# Patient Record
Sex: FEMALE | Race: WHITE | Employment: OTHER | URBAN - METROPOLITAN AREA
[De-identification: names, ages, dates, MRNs, and addresses within clinical notes are randomized per-mention and may not be internally consistent; named-entity substitution may affect disease eponyms.]

---

## 2017-11-14 ENCOUNTER — HOSPITAL ENCOUNTER (OUTPATIENT)
Dept: LAB | Age: 72
Discharge: HOME | End: 2017-11-14
Payer: MEDICARE

## 2017-11-14 ENCOUNTER — OFFICE VISIT (OUTPATIENT)
Dept: FAMILY MEDICINE CLINIC | Age: 72
End: 2017-11-14

## 2017-11-14 VITALS
SYSTOLIC BLOOD PRESSURE: 142 MMHG | HEART RATE: 67 BPM | RESPIRATION RATE: 18 BRPM | BODY MASS INDEX: 25.44 KG/M2 | DIASTOLIC BLOOD PRESSURE: 86 MMHG | OXYGEN SATURATION: 96 % | TEMPERATURE: 98.1 F | WEIGHT: 149 LBS | HEIGHT: 64 IN

## 2017-11-14 DIAGNOSIS — Z12.31 ENCOUNTER FOR SCREENING MAMMOGRAM FOR MALIGNANT NEOPLASM OF BREAST: ICD-10-CM

## 2017-11-14 DIAGNOSIS — Z11.59 NEED FOR HEPATITIS C SCREENING TEST: ICD-10-CM

## 2017-11-14 DIAGNOSIS — Z13.6 SCREENING FOR ISCHEMIC HEART DISEASE: ICD-10-CM

## 2017-11-14 DIAGNOSIS — E78.00 HYPERCHOLESTEROLEMIA: ICD-10-CM

## 2017-11-14 DIAGNOSIS — Z13.39 SCREENING FOR ALCOHOLISM: ICD-10-CM

## 2017-11-14 DIAGNOSIS — Z78.0 POSTMENOPAUSAL: ICD-10-CM

## 2017-11-14 DIAGNOSIS — M94.9 DISORDER OF BONE AND CARTILAGE: ICD-10-CM

## 2017-11-14 DIAGNOSIS — Z13.1 SCREENING FOR DIABETES MELLITUS: ICD-10-CM

## 2017-11-14 DIAGNOSIS — E55.9 HYPOVITAMINOSIS D: ICD-10-CM

## 2017-11-14 DIAGNOSIS — M89.9 DISORDER OF BONE AND CARTILAGE: ICD-10-CM

## 2017-11-14 DIAGNOSIS — Z00.00 MEDICARE ANNUAL WELLNESS VISIT, SUBSEQUENT: Primary | ICD-10-CM

## 2017-11-14 DIAGNOSIS — M79.671 RIGHT FOOT PAIN: ICD-10-CM

## 2017-11-14 LAB
25(OH)D3 SERPL-MCNC: 36.2 NG/ML (ref 30–100)
ALBUMIN SERPL-MCNC: 4.2 G/DL (ref 3.4–5)
ALBUMIN/GLOB SERPL: 1.2 {RATIO} (ref 0.8–1.7)
ALP SERPL-CCNC: 90 U/L (ref 45–117)
ALT SERPL-CCNC: 35 U/L (ref 13–56)
ANION GAP SERPL CALC-SCNC: 7 MMOL/L (ref 3–18)
APPEARANCE UR: CLEAR
AST SERPL-CCNC: 15 U/L (ref 15–37)
BASOPHILS # BLD: 0 K/UL (ref 0–0.06)
BASOPHILS NFR BLD: 1 % (ref 0–2)
BILIRUB DIRECT SERPL-MCNC: 0.1 MG/DL (ref 0–0.2)
BILIRUB SERPL-MCNC: 0.5 MG/DL (ref 0.2–1)
BILIRUB UR QL: NEGATIVE
BUN SERPL-MCNC: 13 MG/DL (ref 7–18)
BUN/CREAT SERPL: 15 (ref 12–20)
CALCIUM SERPL-MCNC: 9.2 MG/DL (ref 8.5–10.1)
CHLORIDE SERPL-SCNC: 102 MMOL/L (ref 100–108)
CHOLEST SERPL-MCNC: 302 MG/DL
CO2 SERPL-SCNC: 30 MMOL/L (ref 21–32)
COLOR UR: YELLOW
CREAT SERPL-MCNC: 0.89 MG/DL (ref 0.6–1.3)
DIFFERENTIAL METHOD BLD: ABNORMAL
EOSINOPHIL # BLD: 0.2 K/UL (ref 0–0.4)
EOSINOPHIL NFR BLD: 4 % (ref 0–5)
ERYTHROCYTE [DISTWIDTH] IN BLOOD BY AUTOMATED COUNT: 13.3 % (ref 11.6–14.5)
GLOBULIN SER CALC-MCNC: 3.5 G/DL (ref 2–4)
GLUCOSE SERPL-MCNC: 89 MG/DL (ref 74–99)
GLUCOSE UR STRIP.AUTO-MCNC: NEGATIVE MG/DL
HCT VFR BLD AUTO: 48.8 % (ref 35–45)
HDLC SERPL-MCNC: 76 MG/DL (ref 40–60)
HDLC SERPL: 4 {RATIO} (ref 0–5)
HGB BLD-MCNC: 15.8 G/DL (ref 12–16)
HGB UR QL STRIP: NEGATIVE
KETONES UR QL STRIP.AUTO: NEGATIVE MG/DL
LDLC SERPL CALC-MCNC: 205.2 MG/DL (ref 0–100)
LEUKOCYTE ESTERASE UR QL STRIP.AUTO: NEGATIVE
LIPID PROFILE,FLP: ABNORMAL
LYMPHOCYTES # BLD: 1.8 K/UL (ref 0.9–3.6)
LYMPHOCYTES NFR BLD: 29 % (ref 21–52)
MCH RBC QN AUTO: 31 PG (ref 24–34)
MCHC RBC AUTO-ENTMCNC: 32.4 G/DL (ref 31–37)
MCV RBC AUTO: 95.7 FL (ref 74–97)
MONOCYTES # BLD: 0.5 K/UL (ref 0.05–1.2)
MONOCYTES NFR BLD: 7 % (ref 3–10)
NEUTS SEG # BLD: 3.9 K/UL (ref 1.8–8)
NEUTS SEG NFR BLD: 59 % (ref 40–73)
NITRITE UR QL STRIP.AUTO: NEGATIVE
PH UR STRIP: 6.5 [PH] (ref 5–8)
PLATELET # BLD AUTO: 279 K/UL (ref 135–420)
PMV BLD AUTO: 9.7 FL (ref 9.2–11.8)
POTASSIUM SERPL-SCNC: 4.2 MMOL/L (ref 3.5–5.5)
PROT SERPL-MCNC: 7.7 G/DL (ref 6.4–8.2)
PROT UR STRIP-MCNC: NEGATIVE MG/DL
RBC # BLD AUTO: 5.1 M/UL (ref 4.2–5.3)
SODIUM SERPL-SCNC: 139 MMOL/L (ref 136–145)
SP GR UR REFRACTOMETRY: 1.01 (ref 1–1.03)
T4 FREE SERPL-MCNC: 1 NG/DL (ref 0.7–1.5)
TRIGL SERPL-MCNC: 104 MG/DL (ref ?–150)
TSH SERPL DL<=0.05 MIU/L-ACNC: 1.69 UIU/ML (ref 0.36–3.74)
UROBILINOGEN UR QL STRIP.AUTO: 0.2 EU/DL (ref 0.2–1)
VLDLC SERPL CALC-MCNC: 20.8 MG/DL
WBC # BLD AUTO: 6.4 K/UL (ref 4.6–13.2)

## 2017-11-14 PROCEDURE — 82306 VITAMIN D 25 HYDROXY: CPT | Performed by: INTERNAL MEDICINE

## 2017-11-14 PROCEDURE — 80076 HEPATIC FUNCTION PANEL: CPT | Performed by: INTERNAL MEDICINE

## 2017-11-14 PROCEDURE — 80048 BASIC METABOLIC PNL TOTAL CA: CPT | Performed by: INTERNAL MEDICINE

## 2017-11-14 PROCEDURE — 84439 ASSAY OF FREE THYROXINE: CPT | Performed by: INTERNAL MEDICINE

## 2017-11-14 PROCEDURE — 80061 LIPID PANEL: CPT | Performed by: INTERNAL MEDICINE

## 2017-11-14 PROCEDURE — 84443 ASSAY THYROID STIM HORMONE: CPT | Performed by: INTERNAL MEDICINE

## 2017-11-14 PROCEDURE — 85025 COMPLETE CBC W/AUTO DIFF WBC: CPT | Performed by: INTERNAL MEDICINE

## 2017-11-14 PROCEDURE — 36415 COLL VENOUS BLD VENIPUNCTURE: CPT | Performed by: INTERNAL MEDICINE

## 2017-11-14 PROCEDURE — 86803 HEPATITIS C AB TEST: CPT | Performed by: INTERNAL MEDICINE

## 2017-11-14 PROCEDURE — 81003 URINALYSIS AUTO W/O SCOPE: CPT | Performed by: INTERNAL MEDICINE

## 2017-11-14 RX ORDER — CLOBETASOL PROPIONATE 0.5 MG/G
CREAM TOPICAL 2 TIMES DAILY
Qty: 15 G | Refills: 0 | Status: SHIPPED | OUTPATIENT
Start: 2017-11-14 | End: 2020-12-01 | Stop reason: ALTCHOICE

## 2017-11-14 NOTE — PROGRESS NOTES
This is a Subsequent Medicare Annual Wellness Exam (AWV) (Performed 12 months after IPPE or effective date of Medicare Part B enrollment)    I have reviewed the patient's medical history in detail and updated the computerized patient record. History     Past Medical History:   Diagnosis Date    Hypercholesterolemia       Past Surgical History:   Procedure Laterality Date    HX GYN      vaginal partial hysterectomy    HX PARTIAL THYROIDECTOMY      HX TONSILLECTOMY         No Known Allergies  Family History   Problem Relation Age of Onset    Hypertension Mother     Heart Disease Father      Social History   Substance Use Topics    Smoking status: Former Smoker     Years: 2.00    Smokeless tobacco: Never Used    Alcohol use Yes      Comment: social     Patient Active Problem List   Diagnosis Code    Hypercholesterolemia E78.00       Depression Risk Factor Screening:     PHQ over the last two weeks 11/14/2017   Little interest or pleasure in doing things Not at all   Feeling down, depressed or hopeless Not at all   Total Score PHQ 2 0     Alcohol Risk Factor Screening: You do not drink alcohol or very rarely. Functional Ability and Level of Safety:   Hearing Loss  Hearing is good. Activities of Daily Living  The home contains: no safety equipment. Patient does total self care    Fall RiskFall Risk Assessment, last 12 mths 11/14/2017   Able to walk? Yes   Fall in past 12 months?  No   Fall with injury? -   Number of falls in past 12 months -   Fall Risk Score -       Abuse Screen  Patient is not abused    Cognitive Screening   Evaluation of Cognitive Function:  Has your family/caregiver stated any concerns about your memory: no  Normal    Patient Care Team   Patient Care Team:  Valentino Freed, MD as PCP - General (Internal Medicine)  Hugo Gibbs MD (Dermatology)  Jono Hernández MD (Optometry)    Assessment/Plan   Education and counseling provided:  Are appropriate based on today's review and evaluation    Diagnoses and all orders for this visit:    1. Medicare annual wellness visit, subsequent    2. Postmenopausal  -     Dexa Bone Density Study Axial ([id number]); Future    3. Screening for alcoholism  -     Annual  Alcohol Screen 15 min ()    4. Screening for diabetes mellitus    5. Screening for ischemic heart disease    6. Disorder of bone and cartilage    7. Encounter for screening mammogram for malignant neoplasm of breast  -     Bilateral Digital Screening Mammography; Future    8. Need for hepatitis C screening test  -     HEPATITIS C AB; Future    9. Hypercholesterolemia  -     CBC WITH AUTOMATED DIFF; Future  -     HEPATIC FUNCTION PANEL; Future  -     LIPID PANEL; Future  -     METABOLIC PANEL, BASIC; Future  -     TSH 3RD GENERATION; Future  -     T4, FREE; Future  -     URINALYSIS W/ RFLX MICROSCOPIC; Future    10. Hypovitaminosis D  -     VITAMIN D, 25 HYDROXY; Future    11. Right foot pain  -     REFERRAL TO PODIATRY    Other orders  -     pneumococcal 13 flaquita conj dip (PREVNAR 13, PF,) 0.5 mL syrg injection; 0.5 mL by IntraMUSCular route once for 1 dose. -     clobetasol (TEMOVATE) 0.05 % topical cream; Apply  to affected area two (2) times a day. Use for 14 days, then stop and reassess        Health Maintenance Due   Topic Date Due    Hepatitis C Screening  1945    Pneumococcal 65+ Low/Medium Risk (2 of 2 - PCV13) 09/02/2015    MEDICARE YEARLY EXAM  10/05/2017         Pt having RT foot pain, in ball of foot, near to the base of 2nd toe. Has been working with her chiropractor for weeks but no resolution. My be a Pal's neuroma. Pt has a small itchy spot on the LT leg. Slight redness. Not resolving.

## 2017-11-14 NOTE — PATIENT INSTRUCTIONS

## 2017-11-14 NOTE — MR AVS SNAPSHOT
Visit Information Date & Time Provider Department Dept. Phone Encounter #  
 [facility]14/2017 10:30 [phone] [id number] Upcoming Health Maintenance Date Due Hepatitis C Screening 1945 Pneumococcal 65+ Low/Medium Risk (2 of 2 - PCV13) 9/2/2015 MEDICARE YEARLY EXAM 10/5/2017 COLONOSCOPY 9/16/2018 GLAUCOMA SCREENING Q2Y 10/4/2018 BREAST CANCER SCRN MAMMOGRAM 10/11/2018 DTaP/Tdap/Td series (2 - Td) 4/14/2021 Allergies as of 11/14/2017  Review Complete On: 11/14/2017 By: Aby Kraft MD  
 No Known Allergies Current Immunizations  Reviewed on 11/14/2017 Name Date Pneumococcal Polysaccharide (PPSV-23) 9/2/2014 Tdap 4/14/2011 Zoster Vaccine, Live 10/22/2014 Reviewed by Aby Kraft MD on 11/14/2017 at 10:47 AM  
You Were Diagnosed With   
  
 Codes Comments Postmenopausal    -  Primary ICD-10-CM: Z78.0 ICD-9-CM: V49.81 Medicare annual wellness visit, subsequent     ICD-10-CM: Z00.00 ICD-9-CM: V70.0 Screening for alcoholism     ICD-10-CM: Z13.89 ICD-9-CM: V79.1 Screening for diabetes mellitus     ICD-10-CM: Z13.1 ICD-9-CM: V77.1 Screening for ischemic heart disease     ICD-10-CM: Z13.6 ICD-9-CM: V81.0 Disorder of bone and cartilage     ICD-10-CM: M89.9, M94.9 ICD-9-CM: 733.90 Encounter for screening mammogram for malignant neoplasm of breast     ICD-10-CM: Z12.31 
ICD-9-CM: V76.12 Vitals BP Pulse Temp Resp Height(growth percentile) Weight(growth percentile) 142/86 (BP 1 Location: Left arm, BP Patient Position: Sitting) 67 98.1 °F (36.7 °C) (Oral) 18 5' 4\" (1.626 m) 149 lb (67.6 kg) SpO2 BMI OB Status Smoking Status 96% 25.58 kg/m2 Hysterectomy Former Smoker BMI and BSA Data Body Mass Index Body Surface Area 25.58 kg/m 2 1.75 m 2 Preferred Pharmacy [facility], [address] [place] [address] AT Nancy Muhammad [phone] Your Updated Medication List  
  
   
This list is accurate as of: 17 11:00 AM.  Always use your most recent med list.  
  
  
  
  
 clobetasol 0.05 % topical cream  
Commonly known as:  Beola John Apply  to affected area two (2) times a day. Use for 14 days, then stop and reassess  
  
 pneumococcal 13 flaquita conj dip 0.5 mL Syrg injection Commonly known as:  PREVNAR 13 (PF)  
0.5 mL by IntraMUSCular route once for 1 dose. Prescriptions Printed Refills  
 pneumococcal 13 flaquita conj dip (PREVNAR 13, PF,) 0.5 mL syrg injection 0 Si.5 mL by IntraMUSCular route once for 1 dose. Class: Print Route: IntraMUSCular Prescriptions Sent to Pharmacy Refills  
 clobetasol (TEMOVATE) 0.05 % topical cream 0 Sig: Apply  to affected area two (2) times a day. Use for 14 days, then stop and reassess Class: Normal  
 Pharmacy: [facility] [address] [facility], [address] [place] [address] Ph #: [phone] Route: Topical  
  
We Performed the Following PA ANNUAL ALCOHOL SCREEN 15 MIN [id number] [facility]] To-Do List   
 2017 Imaging:  DEXA BONE DENSITY STUDY AXIAL   
  
 2017 Imaging:  DIANA MAMMO BI SCREENING INCL CAD Patient Instructions Medicare Wellness Visit, Female The best way to live healthy is to have a healthy lifestyle by eating a well-balanced diet, exercising regularly, limiting alcohol and stopping smoking. Regular physical exams and screening tests are another way to keep healthy. Preventive exams provided by your health care provider can find health problems before they become diseases or illnesses. Preventive services including immunizations, screening tests, monitoring and exams can help you take care of your own health. All people over age 72 should have a pneumovax  and and a prevnar shot to prevent pneumonia. These are once in a lifetime unless you and your provider decide differently. All people over 65 should have a yearly flu shot and a tetanus vaccine every 10 years. A bone mass density to screen for osteoporosis or thinning of the bones should be done every 2 years after 65. Screening for diabetes mellitus with a blood sugar test should be done every year. Glaucoma is a disease of the eye due to increased ocular pressure that can lead to blindness and it should be done every year by an eye professional. 
 
Cardiovascular screening tests that check for elevated lipids (fatty part of blood) which can lead to heart disease and strokes should be done every 5 years. Colorectal screening that evaluates for blood or polyps in your colon should be done yearly as a stool test or every five years as a flexible sigmoidoscope or every 10 years as a colonoscopy up to age 76. Breast cancer screening with a mammogram is recommended biennially  for women age 54-69. Screening for cervical cancer with a pap smear and pelvic exam is recommended for women after age 72 years every 2 years up to age 79 or when the provider and patient decide to stop. If there is a history of cervical abnormalities or other increased risk for cancer then the test is recommended yearly. Hepatitis C screening is also recommended for anyone born between [birth year] through [address]. A shingles vaccine is also recommended once in a lifetime after age 61. Your Medicare Wellness Exam is recommended annually. Here is a list of your current Health Maintenance items with a due date: 
Health Maintenance Due Topic Date Due  
 Hepatitis C Test  1945  Pneumococcal Vaccine (2 of 2 - PCV13) 09/02/2015 [address] [place] [address] Annual Well Visit  10/05/2017 Introducing [facility]! Dear Ana M Kirkland: Thank you for requesting a [employer] account. Our records indicate that you already have an active [employer] account. You can access your account anytime at [email or web address] Did you know that you can access your hospital and ER discharge instructions at any time in [employer]? You can also review all of your test results from your hospital stay or ER visit. Additional Information If you have questions, please visit the Frequently Asked Questions section of the [employer] website at [email or web address] Remember, [employer] is NOT to be used for urgent needs. For medical emergencies, dial 911. Now available from your iPhone and Android! Please provide this summary of care documentation to your next provider. Your primary care clinician is listed as Romain 51. If you have any questions after today's visit, please call [phone].

## 2017-11-14 NOTE — PROGRESS NOTES
Alivia Davalos is a 67 y.o. female (: 1945) presenting to     Chief Complaint   Patient presents with   [facility]    Annual Wellness Visit       Vitals:    17 1026   BP: 142/86   Pulse: 67   Resp: 18   Temp: 98.1 °F (36.7 °C)   TempSrc: Oral   SpO2: 96%   Weight: 149 lb (67.6 kg)   Height: 5' 4\" (1.626 m)   PainSc:   0 - No pain       Hearing/Vision:      Visual Acuity Screening    Right eye Left eye Both eyes   Without correction: 20/25 20/70 20/25   With correction:          Learning Assessment:     Learning Assessment 2015   PRIMARY LEARNER Patient   HIGHEST LEVEL OF EDUCATION - PRIMARY LEARNER  4 YEARS OF COLLEGE   BARRIERS PRIMARY LEARNER NONE   CO-LEARNER CAREGIVER No   PRIMARY LANGUAGE ENGLISH   LEARNER PREFERENCE PRIMARY DEMONSTRATION   ANSWERED BY self   RELATIONSHIP SELF     Depression Screening:     PHQ over the last two weeks 2017   Little interest or pleasure in doing things Not at all   Feeling down, depressed or hopeless Not at all   Total Score PHQ 2 0     Fall Risk Assessment:     Fall Risk Assessment, last 12 mths 2017   Able to walk? Yes   Fall in past 12 months? No   Fall with injury? -   Number of falls in past 12 months -   Fall Risk Score -     Abuse Screening:     Abuse Screening Questionnaire 2017   Do you ever feel afraid of your partner? N   Are you in a relationship with someone who physically or mentally threatens you? N   Is it safe for you to go home? Y     Coordination of Care Questionaire:   1. Have you been to the ER, urgent care clinic since your last visit? Hospitalized since your last visit? no    2. Have you seen or consulted any other health care providers outside of the [address] [place] [address] since your last visit? Include any pap smears or colon screening. dermatology and eye doctor    Advanced Directive:   1. Do you have an Advanced Directive? NO    2. Would you like information on Advanced Directives?  NO    Health Maintenance Due Topic Date Due    Hepatitis C Screening  1945    Pneumococcal 65+ Low/Medium Risk (2 of 2 - PCV13) 09/02/2015    Influenza Age 9 to Adult  08/01/2017    MEDICARE YEARLY EXAM  10/05/2017

## 2017-11-15 LAB
HCV AB SER IA-ACNC: 0.04 INDEX
HCV AB SERPL QL IA: NEGATIVE
HCV COMMENT,HCGAC: NORMAL

## 2017-11-15 NOTE — PROGRESS NOTES
Pt's cholesterol is higher than it has ever been. I know that she does not want to be on meds. She can work on lifestyle habits and return for repeat BW in 3-4 months. Rest of labs are fine.

## 2017-11-30 ENCOUNTER — DOCUMENTATION ONLY (OUTPATIENT)
Dept: FAMILY MEDICINE CLINIC | Age: 72
End: 2017-11-30

## 2017-11-30 NOTE — PROGRESS NOTES
Patient notified her bone density is stable and will continue to monitor continue calcium and vitamin d daily and recheck in 2 years

## 2017-12-05 DIAGNOSIS — Z12.31 ENCOUNTER FOR SCREENING MAMMOGRAM FOR MALIGNANT NEOPLASM OF BREAST: ICD-10-CM

## 2018-01-10 DIAGNOSIS — Z78.0 POSTMENOPAUSAL: ICD-10-CM

## 2018-03-07 ENCOUNTER — OFFICE VISIT (OUTPATIENT)
Dept: FAMILY MEDICINE CLINIC | Age: 73
End: 2018-03-07

## 2018-03-07 VITALS
RESPIRATION RATE: 18 BRPM | OXYGEN SATURATION: 97 % | WEIGHT: 150 LBS | BODY MASS INDEX: 25.61 KG/M2 | HEART RATE: 66 BPM | DIASTOLIC BLOOD PRESSURE: 88 MMHG | HEIGHT: 64 IN | SYSTOLIC BLOOD PRESSURE: 130 MMHG | TEMPERATURE: 97.4 F

## 2018-03-07 DIAGNOSIS — J02.9 PHARYNGITIS, UNSPECIFIED ETIOLOGY: Primary | ICD-10-CM

## 2018-03-07 DIAGNOSIS — J02.9 SORE THROAT: ICD-10-CM

## 2018-03-07 LAB
S PYO AG THROAT QL: NEGATIVE
VALID INTERNAL CONTROL?: YES

## 2018-03-07 RX ORDER — AZITHROMYCIN 500 MG/1
500 TABLET, FILM COATED ORAL DAILY
Qty: 7 TAB | Refills: 0 | Status: SHIPPED | OUTPATIENT
Start: 2018-03-07 | End: 2018-03-14

## 2018-03-07 NOTE — PROGRESS NOTES
Mendel Samples is a 67 y.o. female (: 1945) presenting to address:sore throat/ cough x1 week     Chief Complaint   Patient presents with    Sore Throat     x1 week     Cough       Vitals:    18 1122   BP: 130/88   Pulse: 66   Resp: 18   Temp: 97.4 °F (36.3 °C)   TempSrc: Oral   SpO2: 97%   Weight: 150 lb (68 kg)   Height: 5' 4\" (1.626 m)   PainSc:   8   PainLoc: Throat       Hearing/Vision:   No exam data present    Learning Assessment:     Learning Assessment 2015   PRIMARY LEARNER Patient   HIGHEST LEVEL OF EDUCATION - PRIMARY LEARNER  4 YEARS OF COLLEGE   BARRIERS PRIMARY LEARNER NONE   CO-LEARNER CAREGIVER No   PRIMARY LANGUAGE ENGLISH   LEARNER PREFERENCE PRIMARY DEMONSTRATION   ANSWERED BY self   RELATIONSHIP SELF     Depression Screening:     PHQ over the last two weeks 2017   Little interest or pleasure in doing things Not at all   Feeling down, depressed or hopeless Not at all   Total Score PHQ 2 0     Fall Risk Assessment:     Fall Risk Assessment, last 12 mths 2017   Able to walk? Yes   Fall in past 12 months? No   Fall with injury? -   Number of falls in past 12 months -   Fall Risk Score -     Abuse Screening:     Abuse Screening Questionnaire 2017   Do you ever feel afraid of your partner? N   Are you in a relationship with someone who physically or mentally threatens you? N   Is it safe for you to go home? Y     Coordination of Care Questionaire:   1. Have you been to the ER, urgent care clinic since your last visit? Hospitalized since your last visit? no    2. Have you seen or consulted any other health care providers outside of the [address] [place] [address] since your last visit? Include any pap smears or colon screening. no    Advanced Directive:   1. Do you have an Advanced Directive? no    2. Would you like information on Advanced Directives? No    Patient declined flu vaccine.

## 2018-03-07 NOTE — PROGRESS NOTES
Assessment/Plan:    *Diagnoses and all orders for this visit:    1. Pharyngitis, unspecified etiology  -     azithromycin (ZITHROMAX) 500 mg tab; Take 1 Tab by mouth daily for 7 days. 2. Sore throat  -     AMB POC RAPID STREP A        Will return if not better. The plan was discussed with the patient. The patient verbalized understanding and is in agreement with the plan. All medication potential side effects were discussed with the patient.    -------------------------------------------------------------------------------------------------------------------        Joyce Davis is a 67 y.o. female and presents with Sore Throat (x1 week ) and Cough         Subjective:  Pt here for cold symptoms, nasal discharge, dry mucus, terrible sore throat, dry cough more from the irritated throat x 1 week. Has tried Chloraseptic, lozenges but has not seen any improvement, no fevers. ROS:  Constitutional: No recent weight change. No weakness/fatigue. No f/c. Skin: No rashes, change in nails/hair, itching   HENT: No HA, dizziness. No hearing loss/tinnitus. No nasal congestion/discharge. Eyes: No change in vision, double/blurred vision or eye pain/redness. Cardiovascular: No CP/palpitations. No EVERETT/orthopnea/PND. Respiratory: No cough/sputum, dyspnea, wheezing. Gastointestinal: No dysphagia, reflux. No n/v. No constipation/diarrhea. No melena/rectal bleeding. Genitourinary: No dysuria, urinary hesitancy, nocturia, hematuria. No incontinence. Musculoskeletal: No joint pain/stiffness. No muscle pain/tenderness. Endo: No heat/cold intolerance, no polyuria/polydypsia. Heme: No h/o anemia. No easy bleeding/bruising. Allergy/Immunology: No seasonal rhinitis. Denies frequent colds, sinus/ear infections. Neurological: No seizures/numbness/weakness. No paresthesias. Psychiatric:  No depression, anxiety. The problem list was updated as a part of today's visit.   Patient Active Problem List Diagnosis Code    Hypercholesterolemia E78.00       The PS, FH were reviewed. SH:  Social History   Substance Use Topics    Smoking status: Former Smoker     Years: 2.00    Smokeless tobacco: Never Used    Alcohol use Yes      Comment: social       Medications/Allergies:  Current Outpatient Prescriptions on File Prior to Visit   Medication Sig Dispense Refill    clobetasol (TEMOVATE) 0.05 % topical cream Apply  to affected area two (2) times a day. Use for 14 days, then stop and reassess 15 g 0     No current facility-administered medications on file prior to visit. No Known Allergies      Health Maintenance:   Health Maintenance   Topic Date Due    COLONOSCOPY  09/16/2018    GLAUCOMA SCREENING Q2Y  10/04/2018    MEDICARE YEARLY EXAM  11/15/2018    BREAST CANCER SCRN MAMMOGRAM  11/15/2019    DTaP/Tdap/Td series (2 - Td) 04/14/2021    Hepatitis C Screening  Completed    OSTEOPOROSIS SCREENING (DEXA)  Completed    ZOSTER VACCINE AGE 60>  Completed    Pneumococcal 65+ Low/Medium Risk  Completed    Influenza Age 5 to Adult  Addressed       Objective:  Visit Vitals    /88 (BP 1 Location: Left arm, BP Patient Position: Sitting)    Pulse 66    Temp 97.4 °F (36.3 °C) (Oral)    Resp 18    Ht 5' 4\" (1.626 m)    Wt 150 lb (68 kg)    SpO2 97%    BMI 25.75 kg/m2          Nurses notes and VS reviewed.       Physical Examination: General appearance - ill-appearing  Nose - mucosal erythema  Mouth - erythematous  Neck - supple, no significant adenopathy  Chest - clear to auscultation, no wheezes, rales or rhonchi, symmetric air entry  Heart - normal rate, regular rhythm, normal S1, S2, no murmurs, rubs, clicks or gallops        Labwork and Ancillary Studies:    CBC w/Diff  Lab Results   Component Value Date/Time    WBC 6.4 11/14/2017 11:21 AM    HGB 15.8 11/14/2017 11:21 AM    PLATELET 589 03/41/6925 11:21 AM         Basic Metabolic Profile  Lab Results   Component Value Date/Time Sodium 139 11/14/2017 11:21 AM    Potassium 4.2 11/14/2017 11:21 AM    Chloride 102 11/14/2017 11:21 AM    CO2 30 11/14/2017 11:21 AM    Anion gap 7 11/14/2017 11:21 AM    Glucose 89 11/14/2017 11:21 AM    BUN 13 11/14/2017 11:21 AM    Creatinine 0.89 11/14/2017 11:21 AM    BUN/Creatinine ratio 15 11/14/2017 11:21 AM    GFR est AA >60 11/14/2017 11:21 AM    GFR est non-AA >60 11/14/2017 11:21 AM    Calcium 9.2 11/14/2017 11:21 AM         LFT  Lab Results   Component Value Date/Time    ALT (SGPT) 35 11/14/2017 11:21 AM    AST (SGOT) 15 11/14/2017 11:21 AM    Alk.  phosphatase 90 11/14/2017 11:21 AM    Bilirubin, direct 0.1 11/14/2017 11:21 AM    Bilirubin, total 0.5 11/14/2017 11:21 AM         Cholesterol  Lab Results   Component Value Date/Time    Cholesterol, total 302 (H) 11/14/2017 11:21 AM    HDL Cholesterol 76 (H) 11/14/2017 11:21 AM    LDL, calculated 205.2 (H) 11/14/2017 11:21 AM    Triglyceride 104 11/14/2017 11:21 AM    CHOL/HDL Ratio 4.0 11/14/2017 11:21 AM

## 2018-03-07 NOTE — PATIENT INSTRUCTIONS
Sore Throat: Care Instructions  Your Care Instructions    Infection by bacteria or a virus causes most sore throats. Cigarette smoke, dry air, air pollution, allergies, and yelling can also cause a sore throat. Sore throats can be painful and annoying. Fortunately, most sore throats go away on their own. If you have a bacterial infection, your doctor may prescribe antibiotics. Follow-up care is a key part of your treatment and safety. Be sure to make and go to all appointments, and call your doctor if you are having problems. It's also a good idea to know your test results and keep a list of the medicines you take. How can you care for yourself at home? · If your doctor prescribed antibiotics, take them as directed. Do not stop taking them just because you feel better. You need to take the full course of antibiotics. · Gargle with warm salt water once an hour to help reduce swelling and relieve discomfort. Use 1 teaspoon of salt mixed in 1 cup of warm water. · Take an over-the-counter pain medicine, such as acetaminophen (Tylenol), ibuprofen (Advil, Motrin), or naproxen (Aleve). Read and follow all instructions on the label. · Be careful when taking over-the-counter cold or flu medicines and Tylenol at the same time. Many of these medicines have acetaminophen, which is Tylenol. Read the labels to make sure that you are not taking more than the recommended dose. Too much acetaminophen (Tylenol) can be harmful. · Drink plenty of fluids. Fluids may help soothe an irritated throat. Hot fluids, such as tea or soup, may help decrease throat pain. · Use over-the-counter throat lozenges to soothe pain. Regular cough drops or hard candy may also help. These should not be given to young children because of the risk of choking. · Do not smoke or allow others to smoke around you. If you need help quitting, talk to your doctor about stop-smoking programs and medicines.  These can increase your chances of quitting for good. · Use a vaporizer or humidifier to add moisture to your bedroom. Follow the directions for cleaning the machine. When should you call for help? Call your doctor now or seek immediate medical care if:  ? · You have new or worse trouble swallowing. ? · Your sore throat gets much worse on one side. ? Watch closely for changes in your health, and be sure to contact your doctor if you do not get better as expected. Where can you learn more? Go to http://yolande-marta.info/. Enter [phone] in the search box to learn more about \"Sore Throat: Care Instructions. \"  Current as of: May 12, 2017  Content Version: 11.4  © 9296-0757 [employer]. Care instructions adapted under license by [employer] (which disclaims liability or warranty for this information). If you have questions about a medical condition or this instruction, always ask your healthcare professional. [address] any warranty or liability for your use of this information.

## 2018-03-07 NOTE — MR AVS SNAPSHOT
Yari Fatima 
 
 
 [address] [facility] [address] [phone] Patient: Navi Pederson MRN: [id number] KEY:[id number] Visit Information Date & Time Provider Department Dept. Phone Encounter #  
 3/7/2018 11:00 AM Shelley WhitesideseAi [place] [phone] [id number] Upcoming Health Maintenance Date Due COLONOSCOPY 9/16/2018 GLAUCOMA SCREENING Q2Y 10/4/2018 MEDICARE YEARLY EXAM 11/15/2018 BREAST CANCER SCRN MAMMOGRAM 11/15/2019 DTaP/Tdap/Td series (2 - Td) 4/14/2021 Allergies as of 3/7/2018  Review Complete On: 3/7/2018 By: Shelley Turner MD  
 No Known Allergies Current Immunizations  Reviewed on 11/14/2017 Name Date Pneumococcal Conjugate (PCV-13) 1/30/2018 12:00 AM  
 Pneumococcal Polysaccharide (PPSV-23) 9/2/2014 Tdap 4/14/2011 Zoster Vaccine, Live 10/22/2014 Not reviewed this visit You Were Diagnosed With   
  
 Codes Comments Sore throat    -  Primary ICD-10-CM: J02.9 ICD-9-CM: 185 Pharyngitis, unspecified etiology     ICD-10-CM: J02.9 ICD-9-CM: 878 Vitals BP Pulse Temp Resp Height(growth percentile) Weight(growth percentile) 130/88 (BP 1 Location: Left arm, BP Patient Position: Sitting) 66 97.4 °F (36.3 °C) (Oral) 18 5' 4\" (1.626 m) 150 lb (68 kg) SpO2 BMI OB Status Smoking Status 97% 25.75 kg/m2 Hysterectomy Former Smoker BMI and BSA Data Body Mass Index Body Surface Area 25.75 kg/m 2 1.75 m 2 Preferred Pharmacy Pharmacy Name Phone [facility] [facility] [address] [place]  [phone] Your Updated Medication List  
  
   
This list is accurate as of 3/7/18 11:57 AM.  Always use your most recent med list.  
  
  
  
  
 azithromycin 500 mg Tab Commonly known as:  Larisa Amador Take 1 Tab by mouth daily for 7 days. clobetasol 0.05 % topical cream  
Commonly known as:  Elieser Parker Apply  to affected area two (2) times a day. Use for 14 days, then stop and reassess Prescriptions Sent to Pharmacy Refills  
 azithromycin (ZITHROMAX) 500 mg tab 0 Sig: Take 1 Tab by mouth daily for 7 days. Class: Normal  
 Pharmacy: [facility]/pharmacy [address] [place] [address] #: [phone] Route: Oral  
  
We Performed the Following AMB POC RAPID STREP A [64899 CPT(R)] Patient Instructions Sore Throat: Care Instructions Your Care Instructions Infection by bacteria or a virus causes most sore throats. Cigarette smoke, dry air, air pollution, allergies, and yelling can also cause a sore throat. Sore throats can be painful and annoying. Fortunately, most sore throats go away on their own. If you have a bacterial infection, your doctor may prescribe antibiotics. Follow-up care is a key part of your treatment and safety. Be sure to make and go to all appointments, and call your doctor if you are having problems. It's also a good idea to know your test results and keep a list of the medicines you take. How can you care for yourself at home? · If your doctor prescribed antibiotics, take them as directed. Do not stop taking them just because you feel better. You need to take the full course of antibiotics. · Gargle with warm salt water once an hour to help reduce swelling and relieve discomfort. Use 1 teaspoon of salt mixed in 1 cup of warm water. · Take an over-the-counter pain medicine, such as acetaminophen (Tylenol), ibuprofen (Advil, Motrin), or naproxen (Aleve). Read and follow all instructions on the label. · Be careful when taking over-the-counter cold or flu medicines and Tylenol at the same time. Many of these medicines have acetaminophen, which is Tylenol. Read the labels to make sure that you are not taking more than the recommended dose. Too much acetaminophen (Tylenol) can be harmful. · Drink plenty of fluids. Fluids may help soothe an irritated throat. Hot fluids, such as tea or soup, may help decrease throat pain. · Use over-the-counter throat lozenges to soothe pain. Regular cough drops or hard candy may also help. These should not be given to young children because of the risk of choking. · Do not smoke or allow others to smoke around you. If you need help quitting, talk to your doctor about stop-smoking programs and medicines. These can increase your chances of quitting for good. · Use a vaporizer or humidifier to add moisture to your bedroom. Follow the directions for cleaning the machine. When should you call for help? Call your doctor now or seek immediate medical care if: 
? · You have new or worse trouble swallowing. ? · Your sore throat gets much worse on one side. ? Watch closely for changes in your health, and be sure to contact your doctor if you do not get better as expected. Where can you learn more? Go to http://yolande-marta.info/. Enter [phone] in the search box to learn more about \"Sore Throat: Care Instructions. \" Current as of: May 12, 2017 Content Version: 11.4 © 9284-9900 Healthwise, [employer]. Care instructions adapted under license by [employer] (which disclaims liability or warranty for this information). If you have questions about a medical condition or this instruction, always ask your healthcare professional. [place] [address] any warranty or liability for your use of this information. Introducing [facility] & HEALTH SERVICES! Dear Frantz Garcia: Thank you for requesting a DotNetNuke account. Our records indicate that you already have an active DotNetNuke account. You can access your account anytime at [email or web address]. [email or web address]/[email or web address] Care Solutions Did you know that you can access your hospital and ER discharge instructions at any time in DotNetNuke?   You can also review all of your test results from your hospital stay or ER visit. Additional Information If you have questions, please visit the Frequently Asked Questions section of the Generous Deals website at [email or web address]. "[employer]. The Huffington Post/mychart/. Remember, Generous Deals is NOT to be used for urgent needs. For medical emergencies, dial 911. Now available from your iPhone and Android! Please provide this summary of care documentation to your next provider. Your primary care clinician is listed as Romain Gillespie. If you have any questions after today's visit, please call [phone].

## 2018-04-16 ENCOUNTER — OFFICE VISIT (OUTPATIENT)
Dept: FAMILY MEDICINE CLINIC | Age: 73
End: 2018-04-16

## 2018-04-16 VITALS
HEIGHT: 64 IN | BODY MASS INDEX: 26.02 KG/M2 | TEMPERATURE: 97.8 F | SYSTOLIC BLOOD PRESSURE: 102 MMHG | HEART RATE: 64 BPM | RESPIRATION RATE: 16 BRPM | WEIGHT: 152.4 LBS | DIASTOLIC BLOOD PRESSURE: 66 MMHG | OXYGEN SATURATION: 96 %

## 2018-04-16 DIAGNOSIS — M25.562 ACUTE PAIN OF LEFT KNEE: Primary | ICD-10-CM

## 2018-04-16 NOTE — PROGRESS NOTES
HISTORY OF PRESENT ILLNESS  Rosalba Echavarria is a 67 y.o. female. Knee Pain   The history is provided by the patient and medical records. This is a new problem. Episode onset: 4/12/2018. Review of Systems   Constitutional: Negative for fever and weight loss. Musculoskeletal: Positive for joint pain (lateral left knee pain started during aerobic exercise class 4 days ago, intense at first, improved now, most noticable when descending stairs). Neurological: Negative for dizziness, tingling, sensory change and focal weakness. Visit Vitals    /66 (BP 1 Location: Left arm, BP Patient Position: Sitting)    Pulse 64    Temp 97.8 °F (36.6 °C) (Oral)    Resp 16    Ht 5' 4\" (1.626 m)    Wt 152 lb 6.4 oz (69.1 kg)    SpO2 96%    BMI 26.16 kg/m2       Physical Exam   Constitutional: She is oriented to person, place, and time. She appears well-developed and well-nourished. HENT:   Head: Normocephalic. Eyes: Conjunctivae and EOM are normal.   Neck: Neck supple. Cardiovascular: Normal rate, regular rhythm and normal heart sounds. Pulmonary/Chest: Effort normal and breath sounds normal.   Musculoskeletal: She exhibits no edema. Left knee withno effusion or joint line tenderness, negative anterior drawer, no varus or valgus instability, negative patellar grind  Point tenderness to palpation at insertion of lateral collateral ligament at the proximal tibia   Neurological: She is alert and oriented to person, place, and time. Skin: Skin is warm and dry. Psychiatric: She has a normal mood and affect. Her behavior is normal.   Nursing note and vitals reviewed. ASSESSMENT and PLAN    ICD-10-CM ICD-9-CM    1. Acute pain of left knee M25.562 719.46    Apply warm wet compresses frequently, avoid painful activity, take OTC analgesics such as ibuprofen or acetaminophen as needed. Follow up for new symptoms, worsening symptoms or failure to improve.

## 2018-04-16 NOTE — PROGRESS NOTES
Radha Rocha is a 67 y.o. female here for knee pain      Radha Rocha is a 67 y.o. female (: 1945) presenting to address:    Chief Complaint   Patient presents with    Knee Pain     pt here for L knee pain since last thursday       Vitals:    18 1515   BP: 102/66   Pulse: 64   Resp: 16   Temp: 97.8 °F (36.6 °C)   TempSrc: Oral   SpO2: 96%   Weight: 152 lb 6.4 oz (69.1 kg)   Height: 5' 4\" (1.626 m)   PainSc:   2   PainLoc: Knee       Hearing/Vision:   No exam data present    Learning Assessment:     Learning Assessment 2015   PRIMARY LEARNER Patient   HIGHEST LEVEL OF EDUCATION - PRIMARY LEARNER  4 YEARS OF COLLEGE   BARRIERS PRIMARY LEARNER NONE   CO-LEARNER CAREGIVER No   PRIMARY LANGUAGE ENGLISH   LEARNER PREFERENCE PRIMARY DEMONSTRATION   ANSWERED BY self   RELATIONSHIP SELF     Depression Screening:     PHQ over the last two weeks 2018   Little interest or pleasure in doing things Not at all   Feeling down, depressed or hopeless Not at all   Total Score PHQ 2 0     Fall Risk Assessment:     Fall Risk Assessment, last 12 mths 2018   Able to walk? Yes   Fall in past 12 months? No   Fall with injury? -   Number of falls in past 12 months -   Fall Risk Score -     Abuse Screening:     Abuse Screening Questionnaire 2017   Do you ever feel afraid of your partner? N   Are you in a relationship with someone who physically or mentally threatens you? N   Is it safe for you to go home? Y     Coordination of Care Questionaire:   1. Have you been to the ER, urgent care clinic since your last visit? Hospitalized since your last visit? NO    2. Have you seen or consulted any other health care providers outside of the [address] [place] [address] since your last visit? Include any pap smears or colon screening. NO    Advanced Directive:   1. Do you have an Advanced Directive? NO    2. Would you like information on Advanced Directives?  NO

## 2018-07-13 ENCOUNTER — OFFICE VISIT (OUTPATIENT)
Dept: FAMILY MEDICINE CLINIC | Age: 73
End: 2018-07-13

## 2018-07-13 VITALS
TEMPERATURE: 97.2 F | RESPIRATION RATE: 17 BRPM | HEART RATE: 64 BPM | WEIGHT: 143.8 LBS | OXYGEN SATURATION: 98 % | HEIGHT: 64 IN | BODY MASS INDEX: 24.55 KG/M2 | DIASTOLIC BLOOD PRESSURE: 86 MMHG | SYSTOLIC BLOOD PRESSURE: 118 MMHG

## 2018-07-13 DIAGNOSIS — E55.9 HYPOVITAMINOSIS D: ICD-10-CM

## 2018-07-13 DIAGNOSIS — E78.00 HYPERCHOLESTEROLEMIA: ICD-10-CM

## 2018-07-13 DIAGNOSIS — S76.301A HAMSTRING INJURY, RIGHT, INITIAL ENCOUNTER: Primary | ICD-10-CM

## 2018-07-13 NOTE — MR AVS SNAPSHOT
[address] [address] [address] [facility] [address] [phone] Patient: Emily Moctezuma MRN: [id number] ZYQ:[id number] Visit Information Date & Time Provider Department Dept. Phone Encounter #  
 7/13/2018 10:15 AM Maya Jones, 3 [facility] [phone] [id number] Upcoming Health Maintenance Date Due COLONOSCOPY 9/16/2018 Influenza Age 5 to Adult 8/1/2018 GLAUCOMA SCREENING Q2Y 10/4/2018 MEDICARE YEARLY EXAM 11/15/2018 BREAST CANCER SCRN MAMMOGRAM 11/15/2019 DTaP/Tdap/Td series (2 - Td) 4/14/2021 Allergies as of 7/13/2018  Review Complete On: 7/13/2018 By: Bert Boggs LPN No Known Allergies Current Immunizations  Reviewed on 11/14/2017 Name Date Pneumococcal Conjugate (PCV-13) 1/30/2018 12:00 AM  
 Pneumococcal Polysaccharide (PPSV-23) 9/2/2014 Tdap 4/14/2011 Zoster Vaccine, Live 10/22/2014 Not reviewed this visit You Were Diagnosed With   
  
 Codes Comments Hamstring injury, right, initial encounter    -  Primary ICD-10-CM: S76.301A ICD-9-CM: 883. 6 Vitals BP Pulse Temp Resp Height(growth percentile) Weight(growth percentile) 118/86 (BP 1 Location: Left arm, BP Patient Position: Sitting) 64 97.2 °F (36.2 °C) (Oral) 17 5' 4\" (1.626 m) 143 lb 12.8 oz (65.2 kg) SpO2 BMI OB Status Smoking Status 98% 24.68 kg/m2 Hysterectomy Former Smoker Vitals History BMI and BSA Data Body Mass Index Body Surface Area  
 24.68 kg/m 2 1.72 m 2 Preferred Pharmacy Pharmacy Name Phone [facility] [address] [facility] [address] [phone] Your Updated Medication List  
  
   
This list is accurate as of 7/13/18 11:14 AM.  Always use your most recent med list.  
  
  
  
  
 clobetasol 0.05 % topical cream  
Commonly known as:  Vahe Henning Apply  to affected area two (2) times a day. Use for 14 days, then stop and reassess We Performed the Following REFERRAL TO PHYSICAL THERAPY [BPN22 Custom] Referral Information Referral ID Referred By Referred To  
  
 [id number] [facility]   
   [address] Phone: [phone] Fax: [phone] Visits Status Start Date End Date 1 New Request 7/13/18 7/13/19 If your referral has a status of pending review or denied, additional information will be sent to support the outcome of this decision. Introducing [facility] & HEALTH SERVICES! Dear Ovidio Stephen: Thank you for requesting a Belly Ballot account. Our records indicate that you already have an active Belly Ballot account. You can access your account anytime at [email or web address]://[email or web address] "Nagisa,inc."/Operative Mind Did you know that you can access your hospital and ER discharge instructions at any time in Belly Ballot? You can also review all of your test results from your hospital stay or ER visit. Additional Information If you have questions, please visit the Frequently Asked Questions section of the Belly Ballot website at [email or web address] "N[employer]isa,[email or web address][employer]/Operative Mind/. Remember, Belly Ballot is NOT to be used for urgent needs. For medical emergencies, dial 911. Now available from your iPhone and Android! Please provide this summary of care documentation to your next provider. Your primary care clinician is listed as Romain Gillespie. If you have any questions after today's visit, please call [phone].
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
## 2018-07-16 ENCOUNTER — HOSPITAL ENCOUNTER (OUTPATIENT)
Dept: PHYSICAL THERAPY | Age: 73
Discharge: HOME | End: 2018-07-16
Payer: MEDICARE

## 2018-07-16 PROCEDURE — G8979 MOBILITY GOAL STATUS: HCPCS

## 2018-07-16 PROCEDURE — G8978 MOBILITY CURRENT STATUS: HCPCS

## 2018-07-16 PROCEDURE — 97162 PT EVAL MOD COMPLEX 30 MIN: CPT

## 2018-07-16 NOTE — PROGRESS NOTES
PHYSICAL THERAPY - DAILY TREATMENT NOTE    Patient Name: Eleazar Dyson        Date: 2018  : 1945    Patient  Verified: YES  Visit #:     Insurance: Payor: Hilary Dear / Plan: [place] MEDICARE PART A & B / Product Type: Medicare /      In time: 12:30 Out time: 1:30   Total Treatment Time: 60     Medicare Time Tracking (below)   Total Timed Codes (min):  60 1:1 Treatment Time:  60     TREATMENT AREA/ DIAGNOSIS = Right leg pain [M79.604]    SUBJECTIVE  Pain Level (on 0 to 10 scale):  0  / 10   Medication Changes/New allergies or changes in medical history, any new surgeries or procedures? NO    If yes, update Summary List   Subjective Functional Status/Changes:  []  No changes reported     See Eval     OBJECTIVE         min Patient Education:  Yes    [x] Reviewed HEP   []  Progressed/Changed HEP based on: Other Objective/Functional Measures:    See Eval   Post Treatment Pain Level (on 0 to 10) scale:   0  / 10     ASSESSMENT  Assessment/Changes in Function:     See Eval     []  See Progress Note/Recertification   Patient will continue to benefit from skilled PT services to modify and progress therapeutic interventions, address functional mobility deficits, address strength deficits and analyze and address soft tissue restrictions to attain remaining goals.    Progress toward goals / Updated goals:    See Eval     PLAN  [x]  Upgrade activities as tolerated YES Continue plan of care   []  Discharge due to :    []  Other:      Therapist: George Carlos    Date: 2018 Time: 1:29 PM        Future Appointments  Date Time Provider Marcia Hurtado   2018 1:30 PM [facility] [place] [facility]   2018 2:00 PM [facility]   2018 3:00 PM [facility] [place] [facility]   2018 1:00 PM [facility] [place] [facility]   8/3/2018 12:30 PM [facility] [place] [facility]   2018 12:30 PM [facility] [place] [facility]   8/10/2018 12:30 PM [facility] [place] [facility]   2018 12:30 PM [facility] [place] [facility]   8/17/2018 12:30 PM [facility] [place] [facility]   8/20/2018 12:30 PM [facility] [place] [facility]   8/24/2018 12:30 PM [facility] [place] [facility]   8/27/2018 12:30 PM [facility] [place] [facility]   8/31/2018 12:30 PM [facility] [place] [facility]   11/20/2018 10:00 AM Michelle Obregon MD [facility]

## 2018-07-16 NOTE — PROGRESS NOTES
[facility] PHYSICAL THERAPY AT [facility] [address], [address] - Phone: [phone]  Fax: [phone] / [address] [facility]  Patient Name: Chico Garcia : 1945   Treatment   Diagnosis: R leg pain Medical   Diagnosis: Right leg pain [M79.604]   Onset Date: 2018     Referral Source: Armani Rothman MD Start of [facility]): 2018   Prior Hospitalization: See medical history Provider #: [id number]   Prior Level of Function: Pain free with all ADLs   Comorbidities: High Cholesterol    Medications: Verified on Patient Summary List   The Plan of Care and following information is based on the information from the initial evaluation.   ==================================================================================  Assessment / key information: Pt is a 67 yo female s/p R proximal hamstring rupture repair on 2018. Pt is currently WBAT. She presents with no pain in the involved limb. Hip AROM/PROM: flexion WNL, extension WNL, abduction WNL, adduction WNL, ER WNL,  IR WNL. LE MMT: hip flex 4+/5, abd 4-/5, add 5/5, ext 4+/5, knee flex 4/5, ext 5-/5. Sensation is intact to light touch in the LE.  HS straight leg raise L: 70 deg, R: 45 deg. Pt is limited in the following activities: walking, squatting, and performing in her jazzercise classes. Pt will benefit from PT interventions to address the aforementioned deficits and allow pt to return to PLOF.   Eval Complexity: History LOW Complexity : Zero comorbidities / personal factors that will impact the outcome / POC;  Examination  LOW Complexity : 1-2 Standardized tests and measures addressing body structure, function, activity limitation and / or participation in recreation ; Presentation LOW Complexity : Stable, uncomplicated ;  Decision Making MEDIUM Complexity : FOTO score of 26-74; Overall Complexity MEDIUM ==================================================================================  Problem List: decrease strength, impaired gait/ balance, decrease ADL/ functional abilitiies and decrease activity tolerance   Treatment Plan may include any combination of the following: Therapeutic exercise, Therapeutic activities, Neuromuscular re-education, Physical agent/modality, Gait/balance training, Manual therapy, Patient education, Self Care training and Functional mobility training  Patient / Family readiness to learn indicated by: asking questions, trying to perform skills and interest  Persons(s) to be included in education: patient (P)  Barriers to Learning/Limitations: no  Measures taken:    Patient Goal (s): To return to prior level of function and participate in her jazzercise classes   Patient self reported health status: excellent  Rehabilitation Potential: good   Short Term Goals: To be accomplished in  2  weeks:  1. Pt will be independent and compliant with HEP to decrease pain, increase ROM and return pt to PLOF. 2. Pt will improve HS SLR test to 60 degrees in order to improve hamstring length and flexibility.  Long Term Goals: To be accomplished in  6  weeks:  1. Increase score on FOTO by > or = to 70 points to demo an increase in functional activity tolerance with the LE. 2. Pt will improve all LE MMT to 5/5 in order to perform ADLs more easily. 3. Pt will improve HS SLR test to >70 in order to improve hamstring length and flexibility.    Frequency / Duration:   Patient to be seen  2  times per week for 6  weeks:  Patient / Caregiver education and instruction: self care, activity modification and exercises  G-Codes (GP): MADAI  Therapist Signature: Azalea Julio PT Date: 2/72/1174   Certification Period: 10/16/2018 Time: 1:31 PM   ===========================================================================================  I certify that the above Physical Therapy Services are being furnished while the patient is under my care. I agree with the treatment plan and certify that this therapy is necessary. Physician Signature:        Date:       Time:     Please sign and return to [facility] or you may fax the signed copy to [phone]. Thank you.

## 2018-07-20 ENCOUNTER — HOSPITAL ENCOUNTER (OUTPATIENT)
Dept: PHYSICAL THERAPY | Age: 73
Discharge: HOME | End: 2018-07-20
Payer: MEDICARE

## 2018-07-20 PROCEDURE — 97110 THERAPEUTIC EXERCISES: CPT

## 2018-07-20 NOTE — PROGRESS NOTES
PHYSICAL THERAPY - DAILY TREATMENT NOTE    Patient Name: Alex Massey        Date: 2018  : 1945    Patient  Verified: YES  Visit #:     Insurance: Payor: Kenton Neff / Plan: [place] MED[place]ARE PART [place] & B / Product Type: Medicare /      In time: 1:30 Out time: 2:10   Total Treatment Time: 40     Medicare Time Tracking (below)   Total Timed Codes (min):  30 1:1 Treatment Time:  40     TREATMENT AREA/ DIAGNOSIS = Right leg pain [M79.604]    SUBJECTIVE  Pain Level (on 0 to 10 scale):  2  / 10   Medication Changes/New allergies or changes in medical history, any new surgeries or procedures? NO    If yes, update Summary List   Subjective Functional Status/Changes:  []  No changes reported     Pt reports that she is a little sore today and the leg is a little irritated. She states that she may have done to much the other day and it made her leg a little sore.       OBJECTIVE  Modalities Rationale:     decrease inflammation and decrease pain to improve patient's ability to perform ADLs without pain     min [] Estim, type/location:                                      []  att     []  unatt     []  w/US     []  w/ice    []  w/heat    min []  Mechanical Traction: type/lbs                   []  pro   []  sup   []  int   []  cont    []  before manual    []  after manual    min []  Ultrasound, settings/location:      min []  Iontophoresis w/ dexamethasone, location:                                               []  take home patch       []  in clinic   10 min [x]  Ice     []  Heat    location/position:     min []  Vasopneumatic Device, press/temp:     min []  Other:    [] Skin assessment post-treatment (if applicable):    []  intact    []  redness- no adverse reaction     []redness - adverse reaction:          30 min Therapeutic Exercise:  [x]  See flow sheet   Rationale:      increase strength, improve coordination and improve balance to improve the patients ability to perform ADLs without pain Throughout treatment session min Patient Education:  Yes    [x] Reviewed HEP   []  Progressed/Changed HEP based on: Other Objective/Functional Measures:    Initiated LE strengthening program   Post Treatment Pain Level (on 0 to 10) scale:   0  / 10     ASSESSMENT  Assessment/Changes in Function:     Pt tolerated strengthening exercises well and had no increase in pain. Pt did require mod verbal and tactile cueing for proper squat mechanics to avoid anterior tibial translation. []  See Progress Note/Recertification   Patient will continue to benefit from skilled PT services to modify and progress therapeutic interventions, address functional mobility deficits, address strength deficits, analyze and cue movement patterns and analyze and modify body mechanics/ergonomics to attain remaining goals.    Progress toward goals / Updated goals:    Continue with current treatment plan     PLAN  [x]  Upgrade activities as tolerated YES Continue plan of care   []  Discharge due to :    []  Other:      Therapist: Blessing Malave    Date: 7/20/2018 Time: 1:28 PM        Future Appointments  Date Time Provider Marcia Hurtado   7/20/2018 1:30 PM [facility] PT [facility]   7/23/2018 2:00 PM [facility] PT [facility]   7/27/2018 3:00 PM [facility] PT [facility]   7/30/2018 1:00 PM [facility] PT [facility]   8/3/2018 12:30 PM [facility] PT [facility]   8/6/2018 12:30 PM [facility] PT [facility]   8/10/2018 12:30 PM [facility] PT [facility]   8/13/2018 12:30 PM [facility] PT [facility]   8/17/2018 12:30 PM [facility] PT [facility]   8/20/2018 12:30 PM [facility] PT [facility]   8/24/2018 12:30 PM [facility] PT [facility]   8/27/2018 12:30 PM [facility] PT [facility]   8/31/2018 12:30 PM [facility]   11/20/2018 10:00 AM Priyank Pagan MD [facility]

## 2018-07-23 ENCOUNTER — HOSPITAL ENCOUNTER (OUTPATIENT)
Dept: PHYSICAL THERAPY | Age: 73
Discharge: HOME | End: 2018-07-23
Payer: MEDICARE

## 2018-07-23 PROCEDURE — 97140 MANUAL THERAPY 1/> REGIONS: CPT

## 2018-07-23 PROCEDURE — 97110 THERAPEUTIC EXERCISES: CPT

## 2018-07-23 NOTE — PROGRESS NOTES
PHYSICAL THERAPY - DAILY TREATMENT NOTE    Patient Name: Alf Bradley        Date: 2018  : 1945    Patient  Verified: YES  Visit #:   3   of   12  Insurance: Payor: Miguel Gómez / Plan: [place] MEDICARE PART A & B / Product Type: Medicare /      In time: 2:00 Out time: 2:40   Total Treatment Time: 40     Medicare Time Tracking (below)   Total Timed Codes (min):  30 1:1 Treatment Time:  25     TREATMENT AREA/ DIAGNOSIS = Right leg pain [M79.604]    SUBJECTIVE  Pain Level (on 0 to 10 scale):  0  / 10   Medication Changes/New allergies or changes in medical history, any new surgeries or procedures? NO    If yes, update Summary List   Subjective Functional Status/Changes:  []  No changes reported     Pt reports that she has been doing all the stuff at her exercise class. States that the leg has been feeling stiff.      OBJECTIVE  Modalities Rationale:     decrease inflammation to improve patient's ability to perform ADLs without pain     min [] Estim, type/location:                                      []  att     []  unatt     []  w/US     []  w/ice    []  w/heat    min []  Mechanical Traction: type/lbs                   []  pro   []  sup   []  int   []  cont    []  before manual    []  after manual    min []  Ultrasound, settings/location:      min []  Iontophoresis w/ dexamethasone, location:                                               []  take home patch       []  in clinic   10 min []  Ice     []  Heat    location/position: R hamstring     min []  Vasopneumatic Device, press/temp:     min []  Other:    [] Skin assessment post-treatment (if applicable):    []  intact    []  redness- no adverse reaction     []redness - adverse reaction:        10 min Manual Therapy: STM to R hamstring   Rationale:      increase ROM and increase tissue extensibility to improve patient's ability to perform ADLs without pain      20 min Therapeutic Exercise:  [x]  See flow sheet   Rationale:      increase strength, improve coordination and improve balance to improve the patients ability to perform ADLs without pain           Throughout treatment session min Patient Education:  Yes    [x] Reviewed HEP   []  Progressed/Changed HEP based on: Other Objective/Functional Measures:    Pt had no increase in pain during treatment session  Initaited gentle HS stretch at the stairs and step ups     Post Treatment Pain Level (on 0 to 10) scale:   0  / 10     ASSESSMENT  Assessment/Changes in Function:     Pt benefited from manual therapy based on self reported improvements with gait and performing stairs. Pt also improving based on increased resistance with strengthening program.      []  See Progress Note/Recertification   Patient will continue to benefit from skilled PT services to modify and progress therapeutic interventions, address strength deficits, analyze and address soft tissue restrictions, analyze and cue movement patterns, analyze and modify body mechanics/ergonomics and assess and modify postural abnormalities to attain remaining goals. Progress toward goals / Updated goals:    Continue with current treatment plan.       PLAN  [x]  Upgrade activities as tolerated YES Continue plan of care   []  Discharge due to :    []  Other:      Therapist: Gordon Bales    Date: 7/23/2018 Time: 5:03 PM        Future Appointments  Date Time Provider Marcia Hurtado   7/27/2018 3:00 PM [facility] PT [facility]   7/30/2018 1:00 PM [facility] PT [facility]   8/3/2018 12:30 PM [facility] PT [facility]   8/6/2018 12:30 PM [facility] PT [facility]   8/10/2018 12:30 PM [facility] PT [facility]   8/13/2018 12:30 PM [facility] PT [facility]   8/17/2018 12:30 PM [facility] PT [facility]   8/20/2018 12:30 PM [facility] PT [facility]   8/24/2018 12:30 PM [facility] PT [facility]   8/27/2018 12:30 PM [facility] PT [facility]   8/31/2018 12:30 PM [facility] PT [facility]   11/20/2018 10:00 AM Bruce Jeffrey MD [facility] [address]

## 2018-07-27 ENCOUNTER — HOSPITAL ENCOUNTER (OUTPATIENT)
Dept: PHYSICAL THERAPY | Age: 73
Discharge: HOME | End: 2018-07-27
Payer: MEDICARE

## 2018-07-27 PROCEDURE — 97110 THERAPEUTIC EXERCISES: CPT

## 2018-07-27 PROCEDURE — 97140 MANUAL THERAPY 1/> REGIONS: CPT

## 2018-07-27 NOTE — PROGRESS NOTES
PHYSICAL THERAPY - DAILY TREATMENT NOTE    Patient Name: Eleazar Dyson        Date: 2018  : 1945    Patient  Verified: YES  Visit #:     Insurance: Payor: Hilary Dear / Plan: [place] MEDICARE PART A & B / Product Type: Medicare /      In time: 3:00 Out time: 4:00   Total Treatment Time: 60     Medicare Time Tracking (below)   Total Timed Codes (min):  50 1:1 Treatment Time:  50     TREATMENT AREA/ DIAGNOSIS = Right leg pain [M79.604]    SUBJECTIVE  Pain Level (on 0 to 10 scale):  0  / 10   Medication Changes/New allergies or changes in medical history, any new surgeries or procedures? NO    If yes, update Summary List   Subjective Functional Status/Changes:  []  No changes reported     Pt reports that the attachment site is sore from working out. Pt states that she wasn't very sore after last visit. Pt said she is able to go up stairs without any discomfort.       OBJECTIVE  Modalities Rationale:     decrease inflammation to improve patient's ability to perform ADLs without pain     min [] Estim, type/location:                                      []  att     []  unatt     []  w/US     []  w/ice    []  w/heat    min []  Mechanical Traction: type/lbs                   []  pro   []  sup   []  int   []  cont    []  before manual    []  after manual    min []  Ultrasound, settings/location:      min []  Iontophoresis w/ dexamethasone, location:                                               []  take home patch       []  in clinic   10 min [x]  Ice     []  Heat    location/position: R hamstring    min []  Vasopneumatic Device, press/temp:     min []  Other:    [] Skin assessment post-treatment (if applicable):    []  intact    []  redness- no adverse reaction     []redness - adverse reaction:        10 min Manual Therapy: STM to R hamstring   Rationale:      increase tissue extensibility and decrease trigger points to improve patient's ability to perform ADLs without pain    40 min Therapeutic Exercise:  [x]  See flow sheet   Rationale:      increase strength, improve coordination and improve balance to improve the patients ability to perform ADLs without pain         Throughout treatment session min Patient Education:  Yes    [x] Reviewed HEP   []  Progressed/Changed HEP based on: Other Objective/Functional Measures:    Pt had no pain during treatment session. Initiated eyes closed balance exercise. Post Treatment Pain Level (on 0 to 10) scale:   0  / 10     ASSESSMENT  Assessment/Changes in Function:     Pt benefited from manual therapy based on patients self reported improved gait. Pt required slight verbal cueing during squatting to prevent valgus knee stress at the knee. Pt only able to maintain SLS EC on R LE for about 8 second before loss of balance. []  See Progress Note/Recertification   Patient will continue to benefit from skilled PT services to modify and progress therapeutic interventions, address functional mobility deficits, address strength deficits and analyze and cue movement patterns to attain remaining goals.    Progress toward goals / Updated goals:    Continue with current treatment plan      PLAN  [x]  Upgrade activities as tolerated YES Continue plan of care   []  Discharge due to :    []  Other:      Therapist: George Carlos    Date: 7/27/2018 Time: 2:58 PM        Future Appointments  Date Time Provider Marcia Hurtado   7/27/2018 3:00 PM [facility]   7/30/2018 1:00 PM [facility] PT [facility]   8/3/2018 12:30 PM [facility] PT [facility]   8/6/2018 12:30 PM [facility] PT [facility]   8/10/2018 12:30 PM [facility] PT [facility]   8/13/2018 12:30 PM [facility] PT [facility]   8/17/2018 12:30 PM [facility] PT [facility]   8/20/2018 12:30 PM [facility] PT [facility]   8/24/2018 12:30 PM [facility] PT [facility]   8/27/2018 12:30 PM [facility] PT [facility]   8/31/2018 12:30 PM [facility] PT [facility] AT [facility]   11/20/2018 10:00 AM Jennifer Blanchard MD [facility]

## 2018-07-30 ENCOUNTER — HOSPITAL ENCOUNTER (OUTPATIENT)
Dept: PHYSICAL THERAPY | Age: 73
Discharge: HOME | End: 2018-07-30
Payer: MEDICARE

## 2018-07-30 PROCEDURE — 97140 MANUAL THERAPY 1/> REGIONS: CPT

## 2018-07-30 PROCEDURE — 97110 THERAPEUTIC EXERCISES: CPT

## 2018-08-03 ENCOUNTER — HOSPITAL ENCOUNTER (OUTPATIENT)
Dept: PHYSICAL THERAPY | Age: 73
Discharge: HOME | End: 2018-08-03
Payer: MEDICARE

## 2018-08-03 PROCEDURE — 97110 THERAPEUTIC EXERCISES: CPT

## 2018-08-03 PROCEDURE — 97140 MANUAL THERAPY 1/> REGIONS: CPT

## 2018-08-03 NOTE — PROGRESS NOTES
PHYSICAL THERAPY - DAILY TREATMENT NOTE    Patient Name: Venkatesh Molina        Date: 8/3/2018  : 1945    Patient  Verified: YES  Visit #:     Insurance: Payor: Sri Honey / Plan: [place] MEDICARE PART A & B / Product Type: Medicare /      In time: 12:45 Out time: 1:50   Total Treatment Time: 65     Medicare Time Tracking (below)   Total Timed Codes (min):  55 1:1 Treatment Time:  55     TREATMENT AREA/ DIAGNOSIS = Right leg pain [M79.604]    SUBJECTIVE  Pain Level (on 0 to 10 scale):  1-2  / 10   Medication Changes/New allergies or changes in medical history, any new surgeries or procedures? NO    If yes, update Summary List   Subjective Functional Status/Changes:  []  No changes reported     Pt reports that she is a little sore at the proximal hamstrings. Pt reports that she has been doing all her classes but isnt doing anything that is high impact. OBJECTIVE  Modalities Rationale:     decrease inflammation and decrease pain to improve patient's ability to perform ADLs without pain     min [] Estim, type/location:                                      []  att     []  unatt     []  w/US     []  w/ice    []  w/heat    min []  Mechanical Traction: type/lbs                   []  pro   []  sup   []  int   []  cont    []  before manual    []  after manual    min []  Ultrasound, settings/location:      min []  Iontophoresis w/ dexamethasone, location:                                               []  take home patch       []  in clinic   10 min [x]  Ice     []  Heat    location/position: R Hamstring    min []  Vasopneumatic Device, press/temp:     min []  Other:    [] Skin assessment post-treatment (if applicable):    []  intact    []  redness- no adverse reaction     []redness - adverse reaction:        10 min Manual Therapy: STM to R hamstrings.     Rationale:      decrease pain, increase tissue extensibility and decrease trigger points to improve patient's ability to perform ADLs without pain    45 min Therapeutic Exercise:  [x]  See flow sheet   Rationale:      increase strength, improve coordination and improve balance to improve the patients ability to perform ADLs without pain           Throughout treatment session min Patient Education:  Yes    [x] Reviewed HEP   []  Progressed/Changed HEP based on: Other Objective/Functional Measures:    R HS straight leg raise: 60 degrees (eval measurement 45 degrees)   Post Treatment Pain Level (on 0 to 10) scale:   0  / 10     ASSESSMENT  Assessment/Changes in Function:     Due to current soreness, we did not progress any of the therapeutic exercises this visit and will re evaluate soreness level after next visit. Pt has benefited from manual therapy techniques      []  See Progress Note/Recertification   Patient will continue to benefit from skilled PT services to modify and progress therapeutic interventions, address strength deficits, analyze and address soft tissue restrictions, analyze and cue movement patterns and analyze and modify body mechanics/ergonomics to attain remaining goals.    Progress toward goals / Updated goals:    Continue with current treatment plan     PLAN  [x]  Upgrade activities as tolerated YES Continue plan of care   []  Discharge due to :    []  Other:      Therapist: Kyle Carranza    Date: 8/3/2018 Time: 12:45 PM        Future Appointments  Date Time Provider Marcia Hurtado   8/6/2018 12:30 PM Kyle Dillon [facility]   8/10/2018 12:30 PM Kyle Dillon [facility]   8/13/2018 12:30 PM Kyle Dillon [facility]   8/17/2018 12:30 PM [facility] PT [address]   8/20/2018 12:30 PM Kyle Gervais [facility] AT [facility]   8/24/2018 12:30 PM [facility] PT [address]   8/27/2018 12:30 PM Kyle Gervais [facility]   8/31/2018 12:30 PM [facility] PT [address]   11/20/2018 10:00 AM Lois Atkinson MD [facility]

## 2018-08-06 ENCOUNTER — HOSPITAL ENCOUNTER (OUTPATIENT)
Dept: PHYSICAL THERAPY | Age: 73
Discharge: HOME | End: 2018-08-06
Payer: MEDICARE

## 2018-08-06 PROCEDURE — 97140 MANUAL THERAPY 1/> REGIONS: CPT

## 2018-08-06 PROCEDURE — 97110 THERAPEUTIC EXERCISES: CPT

## 2018-08-06 NOTE — PROGRESS NOTES
PHYSICAL THERAPY - DAILY TREATMENT NOTE    Patient Name: Oksana Kathleen        Date: 2018  : 1945    Patient  Verified: YES  Visit #:     Insurance: Payor: Monica Sesay / Plan: [place] MEDICARE PART A & B / Product Type: Medicare /      In time: 12:30 Out time: 1:30   Total Treatment Time: 60     Medicare Time Tracking (below)   Total Timed Codes (min):  50 1:1 Treatment Time:  30     TREATMENT AREA/ DIAGNOSIS = Right leg pain [M79.604]    SUBJECTIVE  Pain Level (on 0 to 10 scale):  1  / 10   Medication Changes/New allergies or changes in medical history, any new surgeries or procedures? NO    If yes, update Summary List   Subjective Functional Status/Changes:  []  No changes reported     Pt reports that her muscle in her hip is still a little tight. Pt also states that she feels like she is weak when she has to do stepping backward exercises. OBJECTIVE  Modalities Rationale:     decrease inflammation to improve patient's ability to perform ADLs without pain     min [] Estim, type/location:                                      []  att     []  unatt     []  w/US     []  w/ice    []  w/heat    min []  Mechanical Traction: type/lbs                   []  pro   []  sup   []  int   []  cont    []  before manual    []  after manual    min []  Ultrasound, settings/location:      min []  Iontophoresis w/ dexamethasone, location:                                               []  take home patch       []  in clinic   10 min [x]  Ice     []  Heat    location/position:     min []  Vasopneumatic Device, press/temp:     min []  Other:    [] Skin assessment post-treatment (if applicable):    []  intact    []  redness- no adverse reaction     []redness - adverse reaction:        10 min Manual Therapy: STM to R hamstring.  Passive stretching of the R hamstring    Rationale:      increase ROM, increase tissue extensibility and decrease trigger points to improve patient's ability to perform ADLs without pain      40 min Therapeutic Exercise:  [x]  See flow sheet   Rationale:      increase strength, improve coordination and improve balance to improve the patients ability to perform ADLs without pain           Throughout treatment session min Patient Education:  Yes    [x] Reviewed HEP   []  Progressed/Changed HEP based on: Other Objective/Functional Measures:    Initiated Heel raises   Pt SLR still at 60  Initiated against gravity standing HS curl without weight. Post Treatment Pain Level (on 0 to 10) scale:   0  / 10     ASSESSMENT  Assessment/Changes in Function:     Pt had improved ambulation post manual therapy techniques per the patient. Pt tolerated exercises well and had no pain     []  See Progress Note/Recertification   Patient will continue to benefit from skilled PT services to modify and progress therapeutic interventions, address strength deficits, analyze and address soft tissue restrictions and analyze and cue movement patterns to attain remaining goals.    Progress toward goals / Updated goals:    Continue with current treatment plan     PLAN  [x]  Upgrade activities as tolerated YES Continue plan of care   []  Discharge due to :    []  Other:      Therapist: Norberto LewisPT     Date: 8/6/2018 Time: 12:31 PM        Future Appointments  Date Time Provider Marcia Hurtado   8/10/2018 12:30 PM [facility] AT [facility]   8/13/2018 12:30 PM [facility] REHAB C[facility] AT [facility]   8/17/2018 12:30 PM [facility] PT [address]   8/20/2018 12:30 PM [facility] REHAB [facility]ENTER AT [facility]   8/24/2018 12:30 PM [facility] PT [address]   8/27/2018 12:30 PM [facility] REHAB [facility]ENTER AT [facility]   8/31/2018 12:30 PM [facility] PT [address]   11/20/2018 10:00 AM Claudeen Figures, MD [facility]

## 2018-08-10 ENCOUNTER — HOSPITAL ENCOUNTER (OUTPATIENT)
Dept: PHYSICAL THERAPY | Age: 73
Discharge: HOME | End: 2018-08-10
Payer: MEDICARE

## 2018-08-10 PROCEDURE — 97140 MANUAL THERAPY 1/> REGIONS: CPT

## 2018-08-10 PROCEDURE — 97110 THERAPEUTIC EXERCISES: CPT

## 2018-08-10 NOTE — PROGRESS NOTES
PHYSICAL THERAPY - DAILY TREATMENT NOTE    Patient Name: Sahara Davidson        Date: 8/10/2018  : 1945    Patient  Verified: YES  Visit #:     Insurance: Payor: Low Many / Plan: [place] MEDICARE PART A & B / Product Type: Medicare /      In time: 12:30 Out time: 1:35   Total Treatment Time: 65     Medicare Time Tracking (below)   Total Timed Codes (min):  55 1:1 Treatment Time:  55     TREATMENT AREA/ DIAGNOSIS = Right leg pain [M79.604]    SUBJECTIVE  Pain Level (on 0 to 10 scale):  0  / 10   Medication Changes/New allergies or changes in medical history, any new surgeries or procedures? NO    If yes, update Summary List   Subjective Functional Status/Changes:  []  No changes reported     Pt reports that she is feeling pretty good and participated in her jazzercise class this morning. OBJECTIVE  Modalities Rationale:     decrease inflammation and increase tissue extensibility to improve patient's ability to perform ADLs without pain     min [] Estim, type/location:                                      []  att     []  unatt     []  w/US     []  w/ice    []  w/heat    min []  Mechanical Traction: type/lbs                   []  pro   []  sup   []  int   []  cont    []  before manual    []  after manual    min []  Ultrasound, settings/location:      min []  Iontophoresis w/ dexamethasone, location:                                               []  take home patch       []  in clinic   10 min []  Ice     []  Heat    location/position:     min []  Vasopneumatic Device, press/temp:     min []  Other:    [] Skin assessment post-treatment (if applicable):    []  intact    []  redness- no adverse reaction     []redness - adverse reaction:        10 min Manual Therapy: STM to r hamstring.  Passive HS st   Rationale:      increase ROM, increase tissue extensibility and decrease trigger points to improve patient's ability to perform ADLs without pain      45 min Therapeutic Exercise:  [x]  See flow sheet Rationale:      increase strength, improve coordination, improve balance and increase proprioception to improve the patients ability to perform ADLs without pain           Throughout treatment session min Patient Education:  Yes    - Reviewed HEP   []  Progressed/Changed HEP based on: Other Objective/Functional Measures:    R Passive SLR: 50 degrees  Pt had no pain during treatment session  Intiated prone HS curls with ankle weight 1#  Initiated resisted walking at cable column with 10# resistance all four directions   Post Treatment Pain Level (on 0 to 10) scale:   0  / 10     ASSESSMENT  Assessment/Changes in Function:     Pt benefited from manual therapy based on improved passive SLR and self reported improvements with ambulation. []  See Progress Note/Recertification   Patient will continue to benefit from skilled PT services to modify and progress therapeutic interventions, address functional mobility deficits, analyze and address soft tissue restrictions, analyze and cue movement patterns and analyze and modify body mechanics/ergonomics to attain remaining goals. Progress toward goals / Updated goals:    Continue with current treatment plan.      PLAN  [x]  Upgrade activities as tolerated YES Continue plan of care   []  Discharge due to :    []  Other:      Therapist: Sudeep SanchesPT     Date: 8/10/2018 Time: 1:07 PM        Future Appointments  Date Time Provider Marcia Hurtado   8/13/2018 12:30 PM Sudeep Sanches REHAB CENTER AT [facility]   8/16/2018 9:45 AM Clementine García MD [facility]   8/17/2018 3:00 PM Sudeep Sanches REHAB CENTER AT [facility]   8/20/2018 12:30 PM Sudeep Snaches REHAB CENTER AT [facility]   8/24/2018 12:30 PM Sudeep Sanches REHAB CENTER AT [facility]   8/27/2018 12:30 PM Sudeep Sanches REHAB CENTER AT [facility]   8/31/2018 12:30 PM Sudeep Sanches REHAB CENTER AT [facility]   11/20/2018 10:00 AM Clementine García MD [facility]

## 2018-08-13 ENCOUNTER — HOSPITAL ENCOUNTER (OUTPATIENT)
Dept: PHYSICAL THERAPY | Age: 73
Discharge: HOME | End: 2018-08-13
Payer: MEDICARE

## 2018-08-13 PROCEDURE — 97110 THERAPEUTIC EXERCISES: CPT

## 2018-08-13 PROCEDURE — G8979 MOBILITY GOAL STATUS: HCPCS

## 2018-08-13 PROCEDURE — G8978 MOBILITY CURRENT STATUS: HCPCS

## 2018-08-13 PROCEDURE — 97140 MANUAL THERAPY 1/> REGIONS: CPT

## 2018-08-13 NOTE — PROGRESS NOTES
PHYSICAL THERAPY - DAILY TREATMENT NOTE    Patient Name: Alf Bradley        Date: 2018  : 1945    Patient  Verified: YES  Visit #:     Insurance: Payor: Miguel Gómez / Plan: VA MEDICARE PART A & B / Product Type: Medicare /      In time: 12:25 Out time: 1:25   Total Treatment Time: 60     Medicare Time Tracking (below)   Total Timed Codes (min):  60 1:1 Treatment Time:  30     TREATMENT AREA/ DIAGNOSIS = Right leg pain [M79.604]    SUBJECTIVE  Pain Level (on 0 to 10 scale):  0  / 10   Medication Changes/New allergies or changes in medical history, any new surgeries or procedures? NO    If yes, update Summary List   Subjective Functional Status/Changes:  []  No changes reported     See ReCert      OBJECTIVE  10 min Manual Therapy: STM to R hamstring   Rationale:      increase tissue extensibility to improve patient's ability to perform ADLs without pain      50 min Therapeutic Exercise:  [x]  See flow sheet   Rationale:      increase strength, improve coordination, improve balance and increase proprioception to improve the patients ability to perform ADLs without pain         Throughout treatment session min Patient Education:  Yes    [x] Reviewed HEP   []  Progressed/Changed HEP based on: Other Objective/Functional Measures:    See ReCert   Post Treatment Pain Level (on 0 to 10) scale:   0  / 10     ASSESSMENT  Assessment/Changes in Function:     See ReCert     []  See Progress Note/Recertification   Patient will continue to benefit from skilled PT services to address functional mobility deficits, address strength deficits, analyze and address soft tissue restrictions, analyze and cue movement patterns and analyze and modify body mechanics/ergonomics to attain remaining goals.    Progress toward goals / Updated goals:    See ReCert     PLAN  [x]  Upgrade activities as tolerated YES Continue plan of care   []  Discharge due to :    []  Other:      Therapist: Jigar Quiroz ,PT Date: 8/13/2018 Time: 12:24 PM        Future Appointments  Date Time Provider Marcia Hurtado   8/13/2018 12:30 PM Daljit Stallworth REHAB CENTER AT [facility]   8/16/2018 9:45 AM Andrew Vidal MD [address]   8/17/2018 3:00 PM Daljit Stallworth REHAB CENTER AT [facility]   8/20/2018 12:30 PM Daljit Stallworth REHAB CENTER AT [facility]   8/24/2018 12:30 PM Daljit Stallworth REHAB CENTER AT [facility]   8/27/2018 12:30 PM Daljit Stallworth REHAB CENTER AT [facility]   8/31/2018 12:30 PM Daljit Stallworth REHAB CENTER AT [facility]   11/20/2018 10:00 AM Andrew Vidal MD [address] [place] [address]

## 2018-08-13 NOTE — PROGRESS NOTES
[address] THERAPY AT [facility] [address] [place], [address]  Phone: [phone]  Fax: [phone] OF CARE/RECERTIFICATION FOR PHYSICAL THERAPY          Patient Name: Yee Rivera : 1945   Treatment/Medical Diagnosis: Right leg pain [M79.604]   Onset Date: 2018    Referral Source: Aarti Ibarra MD Start of Care [facility]): 2018   Prior Hospitalization: See Medical History Provider #: [id number]   Prior Level of Function: Pain free with all ADLs and participating in her jazzercise classes   Comorbidities: High Cholesterol    Medications: Verified on Patient Summary List   Visits from [facility]: 9 Missed Visits: 0     Goal/Measure of Progress Goal Met? 1. Increase score on FOTO by > or = to 70 points to demo an increase in functional activity tolerance with the LE. Status at last Eval: 44 Current Status: 55 Progressing   2. Pt will improve all LE MMT to 5/5 in order to perform ADLs more easily   Status at last Eval: 4/5 Current Status: 5-/5 Progressing but still lacking endurance   3. Pt will improve HS SLR test to >70 in order to improve hamstring length and flexibility. Status at last Eval: 39 Current Status: 72 Progressing     Key Functional Changes/Progress: Pt is now 12 weeks s/p R HS rupture repair. Pt has shown great improvements in strength however still lacking some endurance. Pt could benefit from initiating light agility training and slow jogging so patient can return to prior level of function and perform all required exercises in her jazzercise classes. Pt has shown a huge decrease in TTP of the R hamstring muscles.     Problem List: decrease strength, impaired gait/ balance, decrease ADL/ functional abilitiies, decrease activity tolerance and decrease flexibility/ joint mobility   Treatment Plan may include any combination of the following: Therapeutic exercise, Therapeutic activities, Neuromuscular re-education, Physical agent/modality, Gait/balance training and Manual therapy  Patient Goal(s) has been updated and includes:      Goals for this certification period include and are to be achieved in   6  weeks:  Continue with current goals number 1-3  Frequency / Duration:   Patient to be seen   2   times per week for   6    weeks:  G-Codes (GP): Mobility [id number] Current  CK= 40-59%   Goal  CJ= 20-39%. The severity rating is based on the FOTO Score  Assessments/Recommendations: Pt may benefit from continued strengthening program and also incorporating some light agility and jogging/jumping activities to allow her to return to prior level of function   If you have any questions/comments please contact us directly at [phone]. Thank you for allowing us to assist in the care of your patient. Therapist Signature: Kyle Carranza Date: 0/18/0531   Certification Period:  Reporting Period: 10/16/2018  7/16/2018 Time: 12:23 PM   NOTE TO PHYSICIAN:  PLEASE COMPLETE THE ORDERS BELOW AND FAX TO   [facility] Physical Therapy at [place]: [phone]. If you are unable to process this request in 24 hours please contact our office: [phone].    ___ I have read the above report and request that my patient continue as recommended.   ___ I have read the above report and request that my patient continue therapy with the following changes/special instructions: ________________________________________________   ___ I have read the above report and request that my patient be discharged from therapy.      Physician Signature:        Date:       Time:

## 2018-08-16 ENCOUNTER — OFFICE VISIT (OUTPATIENT)
Dept: FAMILY MEDICINE CLINIC | Age: 73
End: 2018-08-16

## 2018-08-16 VITALS
HEIGHT: 64 IN | OXYGEN SATURATION: 96 % | DIASTOLIC BLOOD PRESSURE: 72 MMHG | RESPIRATION RATE: 16 BRPM | BODY MASS INDEX: 24.59 KG/M2 | SYSTOLIC BLOOD PRESSURE: 100 MMHG | TEMPERATURE: 98.4 F | HEART RATE: 66 BPM | WEIGHT: 144 LBS

## 2018-08-16 DIAGNOSIS — R00.2 PALPITATIONS: Primary | ICD-10-CM

## 2018-08-16 RX ORDER — DM/PE/ACETAMINOPHEN/CHLORPHENR 10-5-325-2
TABLET, SEQUENTIAL ORAL
COMMUNITY

## 2018-08-16 RX ORDER — MELATONIN
DAILY
COMMUNITY

## 2018-08-16 RX ORDER — LANOLIN ALCOHOL/MO/W.PET/CERES
CREAM (GRAM) TOPICAL
COMMUNITY

## 2018-08-16 RX ORDER — BISMUTH SUBSALICYLATE 262 MG
1 TABLET,CHEWABLE ORAL DAILY
COMMUNITY

## 2018-08-16 NOTE — PROGRESS NOTES
Assessment/Plan:    *Diagnoses and all orders for this visit:    1. Palpitations  -     AMB POC EKG ROUTINE W/ 12 LEADS, INTER & REP        EKG NSR. Pt is feeling great today. She would rather monitor things a bit more to see if there is a pattern and will notify me if things get worse. Will then order further tests. Has PE in Nov.    The plan was discussed with the patient. The patient verbalized understanding and is in agreement with the plan. All medication potential side effects were discussed with the patient.    -------------------------------------------------------------------------------------------------------------------        Farhana Barnes is a 68 y.o. female and presents with Rapid Heart Rate (for a few weeks on and off )         Subjective:  Pt here for f/u. She was going to discuss the swelling in her leg but this has resolved. She has noted some intermittent palpitations in recent weeks. She has never had issues with her heart in past.  Is a very active person. No chest pain or SOB. Has just been under a lot of stress with her recent health issues involving her leg and feels this is causing her to have the palpitations. ROS:  Constitutional: No recent weight change. No weakness/fatigue. No f/c. Skin: No rashes, change in nails/hair, itching   HENT: No HA, dizziness. No hearing loss/tinnitus. No nasal congestion/discharge. Eyes: No change in vision, double/blurred vision or eye pain/redness. Cardiovascular: No CP/palpitations. No EVERETT/orthopnea/PND. Respiratory: No cough/sputum, dyspnea, wheezing. Gastointestinal: No dysphagia, reflux. No n/v. No constipation/diarrhea. No melena/rectal bleeding. Genitourinary: No dysuria, urinary hesitancy, nocturia, hematuria. No incontinence. Musculoskeletal: No joint pain/stiffness. No muscle pain/tenderness. Endo: No heat/cold intolerance, no polyuria/polydypsia. Heme: No h/o anemia. No easy bleeding/bruising. Allergy/Immunology: No seasonal rhinitis. Denies frequent colds, sinus/ear infections. Neurological: No seizures/numbness/weakness. No paresthesias. Psychiatric:  No depression, anxiety. The problem list was updated as a part of today's visit. Patient Active Problem List   Diagnosis Code    Hypercholesterolemia E78.00       The PSH, FH were reviewed. SH:  Social History   Substance Use Topics    Smoking status: Former Smoker     Years: 2.00    Smokeless tobacco: Never Used    Alcohol use Yes      Comment: social       Medications/Allergies:  Current Outpatient Prescriptions on File Prior to Visit   Medication Sig Dispense Refill    clobetasol (TEMOVATE) 0.05 % topical cream Apply  to affected area two (2) times a day. Use for 14 days, then stop and reassess 15 g 0     No current facility-administered medications on file prior to visit. No Known Allergies      Health Maintenance:   Health Maintenance   Topic Date Due    Influenza Age 5 to Adult  08/01/2018    COLONOSCOPY  09/16/2018    GLAUCOMA SCREENING Q2Y  10/04/2018    BREAST CANCER SCRN MAMMOGRAM  11/15/2019    DTaP/Tdap/Td series (2 - Td) 04/14/2021    Hepatitis C Screening  Completed    Bone Densitometry (Dexa) Screening  Completed    ZOSTER VACCINE AGE 60>  Completed    Pneumococcal 65+ Low/Medium Risk  Completed       Objective:  Visit Vitals    /72 (BP 1 Location: Left arm, BP Patient Position: Sitting)    Pulse 66    Temp 98.4 °F (36.9 °C) (Oral)    Resp 16    Ht 5' 4\" (1.626 m)    Wt 144 lb (65.3 kg)    SpO2 96%    BMI 24.72 kg/m2          Nurses notes and VS reviewed.       Physical Examination: General appearance - alert, well appearing, and in no distress  Chest - clear to auscultation, no wheezes, rales or rhonchi, symmetric air entry  Heart - normal rate, regular rhythm, normal S1, S2, no murmurs, rubs, clicks or gallops        Labwork and Ancillary Studies:    CBC w/Diff  Lab Results   Component Value Date/Time    WBC 6.4 11/14/2017 11:21 AM    HGB 15.8 11/14/2017 11:21 AM    PLATELET 609 56/74/7546 11:21 AM         Basic Metabolic Profile  Lab Results   Component Value Date/Time    Sodium 139 11/14/2017 11:21 AM    Potassium 4.2 11/14/2017 11:21 AM    Chloride 102 11/14/2017 11:21 AM    CO2 30 11/14/2017 11:21 AM    Anion gap 7 11/14/2017 11:21 AM    Glucose 89 11/14/2017 11:21 AM    BUN 13 11/14/2017 11:21 AM    Creatinine 0.89 11/14/2017 11:21 AM    BUN/Creatinine ratio 15 11/14/2017 11:21 AM    GFR est AA >60 11/14/2017 11:21 AM    GFR est non-AA >60 11/14/2017 11:21 AM    Calcium 9.2 11/14/2017 11:21 AM         LFT  Lab Results   Component Value Date/Time    ALT (SGPT) 35 11/14/2017 11:21 AM    AST (SGOT) 15 11/14/2017 11:21 AM    Alk.  phosphatase 90 11/14/2017 11:21 AM    Bilirubin, direct 0.1 11/14/2017 11:21 AM    Bilirubin, total 0.5 11/14/2017 11:21 AM         Cholesterol  Lab Results   Component Value Date/Time    Cholesterol, total 302 (H) 11/14/2017 11:21 AM    HDL Cholesterol 76 (H) 11/14/2017 11:21 AM    LDL, calculated 205.2 (H) 11/14/2017 11:21 AM    Triglyceride 104 11/14/2017 11:21 AM    CHOL/HDL Ratio 4.0 11/14/2017 11:21 AM

## 2018-08-16 NOTE — PATIENT INSTRUCTIONS
Palpitations: Care Instructions  Your Care Instructions    Heart palpitations are the uncomfortable sensation that your heart is beating fast or irregularly. You might feel pounding or fluttering in your chest. It might feel like your heart is skipping a beat. Although palpitations may be caused by a heart problem, they also occur because of stress, fatigue, or use of alcohol, caffeine, or nicotine. Many medicines, including diet pills, antihistamines, decongestants, and some herbal products, can cause heart palpitations. Nearly everyone has palpitations from time to time. Depending on your symptoms, your doctor may need to do more tests to try to find the cause of your palpitations. Follow-up care is a key part of your treatment and safety. Be sure to make and go to all appointments, and call your doctor if you are having problems. It's also a good idea to know your test results and keep a list of the medicines you take. How can you care for yourself at home? · Avoid caffeine, nicotine, and excess alcohol. · Do not take illegal drugs, such as methamphetamines and cocaine. · Do not take weight loss or diet medicines unless you talk with your doctor first.  · Get plenty of sleep. · Do not overeat. · If you have palpitations again, take deep breaths and try to relax. This may slow a racing heart. · If you start to feel lightheaded, lie down to avoid injuries that might result if you pass out and fall down. · Keep a record of your palpitations and bring it to your next doctor's appointment. Write down:  ¨ The date and time. ¨ Your pulse. (If your heart is beating fast, it may be hard to count your pulse.)  ¨ What you were doing when the palpitations started. ¨ How long the palpitations lasted. ¨ Any other symptoms. · If an activity causes palpitations, slow down or stop. Talk to your doctor before you do that activity again. · Take your medicines exactly as prescribed.  Call your doctor if you think you are having a problem with your medicine. When should you call for help? Call 911 anytime you think you may need emergency care. For example, call if:    · You passed out (lost consciousness).     · You have symptoms of a heart attack. These may include:  ¨ Chest pain or pressure, or a strange feeling in the chest.  ¨ Sweating. ¨ Shortness of breath. ¨ Pain, pressure, or a strange feeling in the back, neck, jaw, or upper belly or in one or both shoulders or arms. ¨ Lightheadedness or sudden weakness. ¨ A fast or irregular heartbeat. After you call 911, the  may tell you to chew 1 adult-strength or 2 to 4 low-dose aspirin. Wait for an ambulance. Do not try to drive yourself.     · You have symptoms of a stroke. These may include:  ¨ Sudden numbness, tingling, weakness, or loss of movement in your face, arm, or leg, especially on only one side of your body. ¨ Sudden vision changes. ¨ Sudden trouble speaking. ¨ Sudden confusion or trouble understanding simple statements. ¨ Sudden problems with walking or balance. ¨ A sudden, severe headache that is different from past headaches.    Call your doctor now or seek immediate medical care if:    · You have heart palpitations and:  ¨ Are dizzy or lightheaded, or you feel like you may faint. ¨ Have new or increased shortness of breath.    Watch closely for changes in your health, and be sure to contact your doctor if:    · You continue to have heart palpitations. Where can you learn more? Go to http://yolande-marta.info/. Enter R508 in the search box to learn more about \"Palpitations: Care Instructions. \"  Current as of: December 6, 2017  Content Version: 11.7  © 4385-9999 [employer]. Care instructions adapted under license by [employer] (which disclaims liability or warranty for this information).  If you have questions about a medical condition or this instruction, always ask your healthcare professional. [employer] disclaims any warranty or liability for your use of this information.

## 2018-08-16 NOTE — MR AVS SNAPSHOT
[address] [facility] 
 
 
 [address] [facility] [id number] [phone] Patient: Emily Moctezuma MRN: [id number] Visit Information Date & Time Provider Department Dept. Phone Encounter #  
 8/16/2018  9:45 AM Maya Jones, [facility] [phone] [id number] Your Appointments 11/20/2018 10:00 AM  
Office Visit with Maya Jones MD  
[facility] Appt Note: MWE  
 [address] [facility] [address] [facility] [address]  
[phone]  
  
   
 [address] Upcoming Health Maintenance Date Due Influenza Age 5 to Adult 8/1/2018 COLONOSCOPY 9/16/2018 GLAUCOMA SCREENING Q2Y 10/4/2018 BREAST CANCER SCRN MAMMOGRAM 11/15/2019 DTaP/Tdap/Td series (2 - Td) 4/14/2021 Allergies as of 8/16/2018  Review Complete On: 8/16/2018 By: Mahogany Vieyra LPN No Known Allergies Current Immunizations  Reviewed on 11/14/2017 Name Date Pneumococcal Conjugate (PCV-13) 1/30/2018 12:00 AM  
 Pneumococcal Polysaccharide (PPSV-23) 9/2/2014 Tdap 4/14/2011 Zoster Vaccine, Live 10/22/2014 Not reviewed this visit You Were Diagnosed With   
  
 Codes Comments Palpitations    -  Primary ICD-10-CM: R00.2 ICD-9-CM: 785.1 Vitals BP Pulse Temp Resp Height(growth percentile) Weight(growth percentile) 100/72 (BP 1 Location: Left arm, BP Patient Position: Sitting) 66 98.4 °F (36.9 °C) (Oral) 16 5' 4\" (1.626 m) 144 lb (65.3 kg) SpO2 BMI OB Status Smoking Status 96% 24.72 kg/m2 Hysterectomy Former Smoker Vitals History BMI and BSA Data Body Mass Index Body Surface Area 24.72 kg/m 2 1.72 m 2 Preferred Pharmacy Pharmacy Name Phone [facility] [address] [facility] [address] [phone] Your Updated Medication List  
  
   
 This list is accurate as of 8/16/18 10:29 AM.  Always use your most recent med list.  
  
  
  
  
 CALCIUM 600 + D 600-125 mg-unit Tab Generic drug:  calcium-cholecalciferol (d3) Take  by mouth.  
  
 clobetasol 0.05 % topical cream  
Commonly known as:  Catrina Plume Apply  to affected area two (2) times a day. Use for 14 days, then stop and reassess  
  
 fish oil-omega-3 fatty acids 340-1,000 mg capsule Take 1 Cap by mouth daily. GLUCOSAMINE RELIEF 1,000 mg Tab Generic drug:  glucosamine Take  by mouth. Iron 325 mg (65 mg iron) tablet Generic drug:  ferrous sulfate Take  by mouth Daily (before breakfast). multivitamin tablet Commonly known as:  ONE A DAY Take 1 Tab by mouth daily. VITAMIN D3 1,000 unit tablet Generic drug:  cholecalciferol Take  by mouth daily. We Performed the Following AMB POC EKG ROUTINE W/ 12 LEADS, INTER & REP [77667 CPT(R)] To-Do List   
 08/17/2018 3:00 PM  
  Appointment with Goyo Chang at [facility] PT [facility] [place] ([phone])  
  
 08/20/2018 12:30 PM  
  Appointment with Goyo Chang [facility] PT [address] [place] [address] [place] ([phone])  
  
 08/24/2018 12:30 PM  
  Appointment with Goyo Chang at [facility] PT [facility]. [address] IM ([phone])  
  
 08/27/2018 12:30 PM  
  Appointment with Goyo Chang at [facility] PT [facility]. [address] IM ([phone])  
  
 08/31/2018 12:30 PM  
  Appointment with Goyo Chang at [facility] [address] IM ([phone]) Patient Instructions Palpitations: Care Instructions Your Care Instructions Heart palpitations are the uncomfortable sensation that your heart is beating fast or irregularly. You might feel pounding or fluttering in your chest. It might feel like your heart is skipping a beat. Although palpitations may be caused by a heart problem, they also occur because of stress, fatigue, or use of alcohol, caffeine, or nicotine.  Many medicines, including diet pills, antihistamines, decongestants, and some herbal products, can cause heart palpitations. Nearly everyone has palpitations from time to time. Depending on your symptoms, your doctor may need to do more tests to try to find the cause of your palpitations. Follow-up care is a key part of your treatment and safety. Be sure to make and go to all appointments, and call your doctor if you are having problems. It's also a good idea to know your test results and keep a list of the medicines you take. How can you care for yourself at home? · Avoid caffeine, nicotine, and excess alcohol. · Do not take illegal drugs, such as methamphetamines and cocaine. · Do not take weight loss or diet medicines unless you talk with your doctor first. 
· Get plenty of sleep. · Do not overeat. · If you have palpitations again, take deep breaths and try to relax. This may slow a racing heart. · If you start to feel lightheaded, lie down to avoid injuries that might result if you pass out and fall down. · Keep a record of your palpitations and bring it to your next doctor's appointment. Write down: ¨ The date and time. ¨ Your pulse. (If your heart is beating fast, it may be hard to count your pulse.) ¨ What you were doing when the palpitations started. ¨ How long the palpitations lasted. ¨ Any other symptoms. · If an activity causes palpitations, slow down or stop. Talk to your doctor before you do that activity again. · Take your medicines exactly as prescribed. Call your doctor if you think you are having a problem with your medicine. When should you call for help? Call 911 anytime you think you may need emergency care. For example, call if: 
  · You passed out (lost consciousness).  
  · You have symptoms of a heart attack. These may include: ¨ Chest pain or pressure, or a strange feeling in the chest. 
¨ Sweating. ¨ Shortness of breath. ¨ Pain, pressure, or a strange feeling in the back, neck, jaw, or upper belly or in one or both shoulders or arms. ¨ Lightheadedness or sudden weakness. ¨ A fast or irregular heartbeat. After you call 911, the  may tell you to chew 1 adult-strength or 2 to 4 low-dose aspirin. Wait for an ambulance. Do not try to drive yourself.  
  · You have symptoms of a stroke. These may include: 
¨ Sudden numbness, tingling, weakness, or loss of movement in your face, arm, or leg, especially on only one side of your body. ¨ Sudden vision changes. ¨ Sudden trouble speaking. ¨ Sudden confusion or trouble understanding simple statements. ¨ Sudden problems with walking or balance. ¨ A sudden, severe headache that is different from past headaches.  
 Call your doctor now or seek immediate medical care if: 
  · You have heart palpitations and: ¨ Are dizzy or lightheaded, or you feel like you may faint. ¨ Have new or increased shortness of breath.  
 Watch closely for changes in your health, and be sure to contact your doctor if: 
  · You continue to have heart palpitations. Where can you learn more? Go to http://yolande-marta.info/. Enter R508 in the search box to learn more about \"Palpitations: Care Instructions. \" Current as of: December 6, 2017 Content Version: 11.7 © 3447-4864 [employer]ett[employer]. Care instructions adapted under license by [facility] (which disclaims liability or warranty for this information). If you have questions about a medical condition or this instruction, always ask your healthcare professional. [place] [address] any warranty or liability for your use of this information. Introducing [facility]! Dear Fracisco Messer: Thank you for requesting a [employer] account. Our records indicate that you already have an active [employer] account. You can access your account anytime at [email or web address] Did you know that you can access your hospital and ER discharge instructions at any time in [email or web address]? You can also review all of your test results from your hospital stay or ER visit. Additional Information If you have questions, please visit the Frequently Asked Questions section of the [email or web address] website at [email or web address] Remember, [email or web address] is NOT to be used for urgent needs. For medical emergencies, dial 911. Now available from your iPhone and Android! Please provide this summary of care documentation to your next provider. Your primary care clinician is listed as Romain 51. If you have any questions after today's visit, please call [phone].

## 2018-08-17 ENCOUNTER — HOSPITAL ENCOUNTER (OUTPATIENT)
Dept: PHYSICAL THERAPY | Age: 73
Discharge: HOME | End: 2018-08-17
Payer: MEDICARE

## 2018-08-17 PROCEDURE — 97140 MANUAL THERAPY 1/> REGIONS: CPT

## 2018-08-17 PROCEDURE — 97110 THERAPEUTIC EXERCISES: CPT

## 2018-08-17 NOTE — PROGRESS NOTES
PHYSICAL THERAPY - DAILY TREATMENT NOTE    Patient Name: Taran Sousa        Date: 2018  : 1945    Patient  Verified: YES  Visit #:   10   of   16  Insurance: Payor: Carolina Beans / Plan: [place] MEDICARE PART A & B / Product Type: Medicare /      In time: 3:00 Out time: 4:05   Total Treatment Time: 65     Medicare Time Tracking (below)   Total Timed Codes (min):  55 1:1 Treatment Time:  25     TREATMENT AREA/ DIAGNOSIS = Right leg pain [M79.604]    SUBJECTIVE  Pain Level (on 0 to 10 scale):  0  / 10   Medication Changes/New allergies or changes in medical history, any new surgeries or procedures? NO    If yes, update Summary List   Subjective Functional Status/Changes:  []  No changes reported     Pt reports that she isnt having any pain today.  She thinks the hamstring is getting better      OBJECTIVE  Modalities Rationale:     decrease inflammation to improve patient's ability to perform ADLs without pain     min [] Estim, type/location:                                      []  att     []  unatt     []  w/US     []  w/ice    []  w/heat    min []  Mechanical Traction: type/lbs                   []  pro   []  sup   []  int   []  cont    []  before manual    []  after manual    min []  Ultrasound, settings/location:      min []  Iontophoresis w/ dexamethasone, location:                                               []  take home patch       []  in clinic   10 min [x]  Ice     []  Heat    location/position: R hamstring    min []  Vasopneumatic Device, press/temp:     min []  Other:    [] Skin assessment post-treatment (if applicable):    []  intact    []  redness- no adverse reaction     []redness - adverse reaction:        10 min Manual Therapy: STM to R hamstring   Rationale:      increase tissue extensibility to improve patient's ability to perform ADLs without pain    45 min Therapeutic Exercise:  [x]  See flow sheet   Rationale:      increase strength, improve coordination, improve balance and increase proprioception to improve the patients ability to perform ADLs without pain           Throughout treatment session min Patient Education:  Yes    [x] Reviewed HEP   []  Progressed/Changed HEP based on: Other Objective/Functional Measures:    Pt had no increase in pain during treatment session  Increased resistance of hip open chain exercises. Post Treatment Pain Level (on 0 to 10) scale:   0  / 10     ASSESSMENT  Assessment/Changes in Function:     Pt tolerated the progression well. Pt had good mechanics with there ex and on needed occasional cueing. []  See Progress Note/Recertification   Patient will continue to benefit from skilled PT services to modify and progress therapeutic interventions, address strength deficits, analyze and address soft tissue restrictions, analyze and cue movement patterns, analyze and modify body mechanics/ergonomics and assess and modify postural abnormalities to attain remaining goals.    Progress toward goals / Updated goals:    Continue with current treatment plan     PLAN  [x]  Upgrade activities as tolerated YES Continue plan of care   []  Discharge due to :    []  Other:      Therapist: Becca GoPT     Date: 8/17/2018 Time: 3:09 PM        Future Appointments  Date Time Provider Marcia Hurtado   8/20/2018 12:30 PM Becca Go [facility] AT [facility]   8/24/2018 12:30 PM Becca Go [facility]   8/27/2018 12:30 PM Becca Go [facility] AT [facility]   8/31/2018 12:30 PM Becca Go [facility] AT [facility]   11/20/2018 10:00 AM Alexander Valencia MD [facility]

## 2018-08-20 ENCOUNTER — HOSPITAL ENCOUNTER (OUTPATIENT)
Dept: PHYSICAL THERAPY | Age: 73
Discharge: HOME | End: 2018-08-20
Payer: MEDICARE

## 2018-08-20 PROCEDURE — 97110 THERAPEUTIC EXERCISES: CPT

## 2018-08-20 PROCEDURE — 97140 MANUAL THERAPY 1/> REGIONS: CPT

## 2018-08-20 NOTE — PROGRESS NOTES
PHYSICAL THERAPY - DAILY TREATMENT NOTE    Patient Name: Viri Benson        Date: 2018  : 1945    Patient  Verified: YES  Visit #:     Insurance: Payor: Osorio Signs / Plan: [place] MEDICARE PART A & B / Product Type: Medicare /      In time: 12:30 Out time: 1:25   Total Treatment Time: 55     Medicare Time Tracking (below)   Total Timed Codes (min):  55 1:1 Treatment Time:  30     TREATMENT AREA/ DIAGNOSIS = Right leg pain [M79.604]    SUBJECTIVE  Pain Level (on 0 to 10 scale):  0  / 10   Medication Changes/New allergies or changes in medical history, any new surgeries or procedures? NO    If yes, update Summary List   Subjective Functional Status/Changes:  []  No changes reported     Pt reports that she isnt having any pain lately and feels like it is getting a lot better      OBJECTIVE    10 min Manual Therapy: STM to R hamstring   Rationale:      increase tissue extensibility and decrease trigger points to improve patient's ability to perform ADLs without pain      45 min Therapeutic Exercise:  [x]  See flow sheet   Rationale:      increase strength, improve coordination, improve balance and increase proprioception to improve the patients ability to perform ADLs without pain           Throughout treatment session min Patient Education:  Yes    [x] Reviewed HEP   []  Progressed/Changed HEP based on: Other Objective/Functional Measures:    Pt had no increase in pain during treatment session. Post Treatment Pain Level (on 0 to 10) scale:   0  / 10     ASSESSMENT  Assessment/Changes in Function:     Pt shows good mechanics with exercises and only needed occasional cueing for correction.      []  See Progress Note/Recertification   Patient will continue to benefit from skilled PT services to address functional mobility deficits, address strength deficits, analyze and address soft tissue restrictions, analyze and cue movement patterns and analyze and modify body mechanics/ergonomics to attain remaining goals.    Progress toward goals / Updated goals:    Continue with current treatment plan     PLAN  [x]  Upgrade activities as tolerated YES Continue plan of care   []  Discharge due to :    []  Other:      Therapist: Nicole GarciaPT     Date: 8/20/2018 Time: 12:34 PM        Future Appointments  Date Time Provider Marcia Hurtado   8/24/2018 12:30 PM Nicole Garcia [facility]   8/27/2018 12:30 [facility]   8/31/2018 12:30 PM [facility]   11/20/2018 10:00 AM Faraz Sorenson MD [facility]

## 2018-08-24 ENCOUNTER — HOSPITAL ENCOUNTER (OUTPATIENT)
Dept: PHYSICAL THERAPY | Age: 73
Discharge: HOME | End: 2018-08-24
Payer: MEDICARE

## 2018-08-24 PROCEDURE — 97140 MANUAL THERAPY 1/> REGIONS: CPT

## 2018-08-24 PROCEDURE — 97110 THERAPEUTIC EXERCISES: CPT

## 2018-08-24 NOTE — PROGRESS NOTES
PHYSICAL THERAPY - DAILY TREATMENT NOTE    Patient Name: Miko Brar        Date: 2018  : 1945    Patient  Verified: YES  Visit #:     Insurance: Payor: Delia Fairchild / Plan: [place] MEDICARE PART A & B / Product Type: Medicare /      In time: 12:30 Out time: 1:30   Total Treatment Time: 60     Medicare Time Tracking (below)   Total Timed Codes (min):  60 1:1 Treatment Time:  40     TREATMENT AREA/ DIAGNOSIS = Right leg pain [M79.604]    SUBJECTIVE  Pain Level (on 0 to 10 scale):  0  / 10   Medication Changes/New allergies or changes in medical history, any new surgeries or procedures? NO    If yes, update Summary List   Subjective Functional Status/Changes:  []  No changes reported     Pt reports that she isnt having any pain but still feels a little weak. Pt said she had trouble during her class holding her foot up off the ground with her right leg. OBJECTIVE    10 min Manual Therapy: STM to R hamstring   Rationale:      increase tissue extensibility and decrease trigger points to improve patient's ability to perform ADLs without pain      50 min Therapeutic Exercise:  [x]  See flow sheet   Rationale:      increase strength, improve coordination, improve balance and increase proprioception to improve the patients ability to perform ADLs without pain           Throughout treatment session min Patient Education:  Yes    [x] Reviewed HEP   []  Progressed/Changed HEP based on: Other Objective/Functional Measures:    Pt had no increase in pain during treatment session. Initiated light agility ladder drills   Post Treatment Pain Level (on 0 to 10) scale:   0  / 10     ASSESSMENT  Assessment/Changes in Function:     Pt tolerated the agility ladder well but states she could tell a difference between the legs.  Agility ladder used to simulate exercises classes that the patient is currently taking part in.     []  See Progress Note/Recertification   Patient will continue to benefit from skilled PT services to modify and progress therapeutic interventions, address functional mobility deficits, address strength deficits, analyze and address soft tissue restrictions and analyze and cue movement patterns to attain remaining goals.    Progress toward goals / Updated goals:    Continue with current treatment plan     PLAN  [x]  Upgrade activities as tolerated YES Continue plan of care   []  Discharge due to :    []  Other:      Therapist: Natty LagunasPT     Date: 8/24/2018 Time: 12:31 PM        Future Appointments  Date Time Provider Marcia Hurtado   8/27/2018 12:30 PM Natty Lagunas [facility]   8/31/2018 3:00 PM Natty Lagunas [facility]   11/20/2018 10:00 AM Vinayak Jackson MD [facility]

## 2018-08-27 ENCOUNTER — HOSPITAL ENCOUNTER (OUTPATIENT)
Dept: PHYSICAL THERAPY | Age: 73
Discharge: HOME | End: 2018-08-27
Payer: MEDICARE

## 2018-08-27 PROCEDURE — 97110 THERAPEUTIC EXERCISES: CPT

## 2018-08-27 PROCEDURE — 97140 MANUAL THERAPY 1/> REGIONS: CPT

## 2018-08-27 NOTE — PROGRESS NOTES
PHYSICAL THERAPY - DAILY TREATMENT NOTE    Patient Name: Viri Benson        Date: 2018  : 1945    Patient  Verified: YES  Visit #:   15   of   16  Insurance: Payor: Osorio Signs / Plan: VA MEDICARE PART A & B / Product Type: Medicare /      In time: 12:30 Out time: 1:30   Total Treatment Time: 60     Medicare Time Tracking (below)   Total Timed Codes (min):  60 1:1 Treatment Time:  30     TREATMENT AREA/ DIAGNOSIS = Right leg pain [M79.604]    SUBJECTIVE  Pain Level (on 0 to 10 scale):  0  / 10   Medication Changes/New allergies or changes in medical history, any new surgeries or procedures? NO    If yes, update Summary List   Subjective Functional Status/Changes:  []  No changes reported     Pt reports that she felt good after last visit and didn't have any increase in pain. OBJECTIVE  10 min Manual Therapy: STM to Right Hamstring   Rationale:      increase tissue extensibility and decrease trigger points to improve patient's ability to perform ADLs without pain      50/20 min Therapeutic Exercise:  [x]  See flow sheet   Rationale:      increase strength to improve the patients ability to perform ADLs without pain           Throughout treatment session min Patient Education:  Yes    [x] Reviewed HEP   []  Progressed/Changed HEP based on: Other Objective/Functional Measures:    Pt had no increase in pain today. Post Treatment Pain Level (on 0 to 10) scale:   0  / 10     ASSESSMENT  Assessment/Changes in Function:     Pt showed improvement based on better performance with agility ladder exercises. []  See Progress Note/Recertification   Patient will continue to benefit from skilled PT services to modify and progress therapeutic interventions, address functional mobility deficits, address strength deficits, analyze and address soft tissue restrictions, analyze and cue movement patterns and analyze and modify body mechanics/ergonomics to attain remaining goals.    Progress toward goals / Updated goals:    Continue with current treatment plan.       PLAN  [x]  Upgrade activities as tolerated YES Continue plan of care   []  Discharge due to :    []  Other:      Therapist: Blessing MalavePT     Date: 8/27/2018 Time: 12:57 PM        Future Appointments  Date Time Provider Marcia Hurtado   8/31/2018 3:00 PM Blessing Malave [facility] AT [facility]   11/20/2018 10:00 AM Priyank Pagan MD [facility]

## 2018-08-31 ENCOUNTER — HOSPITAL ENCOUNTER (OUTPATIENT)
Dept: PHYSICAL THERAPY | Age: 73
Discharge: HOME | End: 2018-08-31
Payer: MEDICARE

## 2018-08-31 PROCEDURE — 97140 MANUAL THERAPY 1/> REGIONS: CPT

## 2018-08-31 PROCEDURE — 97110 THERAPEUTIC EXERCISES: CPT

## 2018-08-31 NOTE — PROGRESS NOTES
PHYSICAL THERAPY - DAILY TREATMENT NOTE    Patient Name: Viri Benson        Date: 2018  : 1945    Patient  Verified: YES  Visit #:   15   of   20  Insurance: Payor: Osorio Signs / Plan: VA MEDICARE PART A & B / Product Type: Medicare /      In time: 3:00 Out time: 4:00   Total Treatment Time: 60     Medicare Time Tracking (below)   Total Timed Codes (min):  60 1:1 Treatment Time:  40     TREATMENT AREA/ DIAGNOSIS = Right leg pain [M79.604]    SUBJECTIVE  Pain Level (on 0 to 10 scale):  0  / 10   Medication Changes/New allergies or changes in medical history, any new surgeries or procedures? NO    If yes, update Summary List   Subjective Functional Status/Changes:  []  No changes reported     Pt reports that's she is feeling good. Pt states she did some of the high impact stuff in her classes and felt good      OBJECTIVE    10 min Manual Therapy: STM to R hamstring   Rationale:      increase tissue extensibility and decrease trigger points to improve patient's ability to perform ADLs without pain      50/30 min Therapeutic Exercise:  [x]  See flow sheet   Rationale:      increase strength, improve coordination and improve balance to improve the patients ability to perform ADLs without pain           Throughout treatment session min Patient Education:  Yes    [x] Reviewed HEP   []  Progressed/Changed HEP based on: Other Objective/Functional Measures:    Pt had no pain with treatment session  Initiated jumping during agility ladder drills   Post Treatment Pain Level (on 0 to 10) scale:   0  / 10     ASSESSMENT  Assessment/Changes in Function:     Pt showing improvement based on no pain during jumping. Pt needed minimal cueing for proper mechanics with exercises.      []  See Progress Note/Recertification   Patient will continue to benefit from skilled PT services to modify and progress therapeutic interventions, address functional mobility deficits, address strength deficits, analyze and address soft tissue restrictions and analyze and cue movement patterns to attain remaining goals.    Progress toward goals / Updated goals:    Continue with current treatment plan     PLAN  [x]  Upgrade activities as tolerated YES Continue plan of care   []  Discharge due to :    []  Other:      Therapist: Helene Royal PT     Date: 8/31/2018 Time: 3:00 PM        Future Appointments  Date Time Provider Marcia Hurtado   11/20/2018 10:00 AM Azalea Hudson  [address] [place]

## 2018-09-07 ENCOUNTER — HOSPITAL ENCOUNTER (OUTPATIENT)
Dept: PHYSICAL THERAPY | Age: 73
Discharge: HOME | End: 2018-09-07
Payer: MEDICARE

## 2018-09-07 PROCEDURE — G8980 MOBILITY D/C STATUS: HCPCS

## 2018-09-07 PROCEDURE — G8979 MOBILITY GOAL STATUS: HCPCS

## 2018-09-07 PROCEDURE — 97140 MANUAL THERAPY 1/> REGIONS: CPT

## 2018-09-07 PROCEDURE — 97110 THERAPEUTIC EXERCISES: CPT

## 2018-09-07 NOTE — PROGRESS NOTES
PHYSICAL THERAPY - DAILY TREATMENT NOTE Patient Name: Miko Brar        Date: 2018 : 1945    Patient  Verified: Gordon Nickicollin Visit #:   15   of   15  Insurance: Payor: [place] MEDICARE / Plan: [place] MEDICARE PART A & B / Product Type: Medicare / In time: 12:30 Out time: 1:10 Total Treatment Time: 40 Medicare Time Tracking (below) Total Timed Codes (min):  40 1:1 Treatment Time:  25 TREATMENT AREA/ DIAGNOSIS = Right leg pain [M79.604] SUBJECTIVE Pain Level (on 0 to 10 scale):  0  / 10 Medication Changes/New allergies or changes in medical history, any new surgeries or procedures? NO    If yes, update Summary List  
Subjective Functional Status/Changes:  []  No changes reported Pt reports that she is doing great and thinks this should be her last visit OBJECTIVE 10 min Manual Therapy: STM to R hamstring Rationale:      increase ROM and increase tissue extensibility to improve patient's ability to perform ADLs without pain 30 min Therapeutic Exercise:  [x]  See flow sheet Rationale:      increase strength, improve coordination and improve balance to improve the patients ability to perform ADLs without pain Throughout treatment session min Patient Education:  Yes    [x] Reviewed HEP []  Progressed/Changed HEP based on: Other Objective/Functional Measures: 
 
Passive SLR: 70 FOTO score: 80 
  
Post Treatment Pain Level (on 0 to 10) scale:   0  / 10 ASSESSMENT Assessment/Changes in Function:  
 
Pt doing great and is going to be discharged with HEP []  See Progress Note/Recertification Progress toward goals / Updated goals: 
 
Discharge PLAN 
[]  Upgrade activities as tolerated YES Continue plan of care  
[]  Discharge due to :   
[]  Other:   
 
Therapist: Ana NguyenPT Date: 2018 Time: 1:25 PM  
 
  
Future Appointments Date Time Provider Marcia Hurtado 2018 10:00 AM Diana Jurado MD [facility]

## 2018-10-26 NOTE — PROGRESS NOTES
Aida Gee [address] [facility] PHYSICAL THERAPY AT [address] [facility], [address] [facility], [address]  Phone: [phone]  Fax: [phone] DISCHARGE SUMMARY FOR PHYSICAL THERAPY Patient Name: Sahara Davidson : 1945 Treatment/Medical Diagnosis: Right leg pain [M79.604] Onset Date: 2018 Referral Source: Ashu Phelps MD Start of Pending sale to [facility]): 2018 Prior Hospitalization: See Medical History Provider #: [id number] Prior Level of Function: Pain free with all ADLs and participating in her jazzercise classes Comorbidities: High Cholesterol Medications: Verified on Patient Summary List  
Visits from [facility]: 15 Missed Visits: 0 Goal/Measure of Progress Goal Met? 1. Increase score on FOTO by > or = to 70 points to demo an increase in functional activity tolerance with the LE. Status at last Eval: 55 Current Status: 80 yes 2. Pt will improve all LE MMT to 5/5 in order to perform ADLs more easily Status at last Eval: 5-/5 Current Status: 5/5 yes 3. Pt will improve HS SLR test to >70 in order to improve hamstring length and flexibility. Status at last Eval: 65 Current Status: 72 yes Key Functional Changes/Progress: Pt is completely pain free and has return to all previous activities without issues. G-Codes (GP): Mobility  [id number] Goal  CJ= 20-39%  D/C  CJ= 20-39%. The severity rating is based on the FOTO Score Assessments/Recommendations: Discontinue therapy. Progressing towards or have reached established goals. If you have any questions/comments please contact us directly at [phone]. Thank you for allowing us to assist in the care of your patient. Therapist Signature: Becca Go Date: 10/26/2018 Reporting Period: 2018-2018 Time: 2:03 PM

## 2018-11-13 ENCOUNTER — HOSPITAL ENCOUNTER (OUTPATIENT)
Dept: LAB | Age: 73
Discharge: HOME | End: 2018-11-13
Payer: MEDICARE

## 2018-11-13 DIAGNOSIS — E78.00 HYPERCHOLESTEROLEMIA: ICD-10-CM

## 2018-11-13 LAB
ALBUMIN SERPL-MCNC: 4 G/DL (ref 3.4–5)
ALBUMIN/GLOB SERPL: 1.3 {RATIO} (ref 0.8–1.7)
ALP SERPL-CCNC: 73 U/L (ref 45–117)
ALT SERPL-CCNC: 26 U/L (ref 13–56)
ANION GAP SERPL CALC-SCNC: 6 MMOL/L (ref 3–18)
APPEARANCE UR: CLEAR
AST SERPL-CCNC: 12 U/L (ref 15–37)
BASOPHILS # BLD: 0 K/UL (ref 0–0.1)
BASOPHILS NFR BLD: 1 % (ref 0–2)
BILIRUB DIRECT SERPL-MCNC: 0.1 MG/DL (ref 0–0.2)
BILIRUB SERPL-MCNC: 0.5 MG/DL (ref 0.2–1)
BILIRUB UR QL: NEGATIVE
BUN SERPL-MCNC: 12 MG/DL (ref 7–18)
BUN/CREAT SERPL: 16 (ref 12–20)
CALCIUM SERPL-MCNC: 9 MG/DL (ref 8.5–10.1)
CHLORIDE SERPL-SCNC: 99 MMOL/L (ref 100–108)
CHOLEST SERPL-MCNC: 296 MG/DL
CO2 SERPL-SCNC: 28 MMOL/L (ref 21–32)
COLOR UR: YELLOW
CREAT SERPL-MCNC: 0.77 MG/DL (ref 0.6–1.3)
DIFFERENTIAL METHOD BLD: ABNORMAL
EOSINOPHIL # BLD: 0.2 K/UL (ref 0–0.4)
EOSINOPHIL NFR BLD: 4 % (ref 0–5)
ERYTHROCYTE [DISTWIDTH] IN BLOOD BY AUTOMATED COUNT: 13.7 % (ref 11.6–14.5)
GLOBULIN SER CALC-MCNC: 3 G/DL (ref 2–4)
GLUCOSE SERPL-MCNC: 92 MG/DL (ref 74–99)
GLUCOSE UR STRIP.AUTO-MCNC: NEGATIVE MG/DL
HCT VFR BLD AUTO: 45 % (ref 35–45)
HDLC SERPL-MCNC: 63 MG/DL (ref 40–60)
HDLC SERPL: 4.7 {RATIO} (ref 0–5)
HGB BLD-MCNC: 14.8 G/DL (ref 12–16)
HGB UR QL STRIP: NEGATIVE
KETONES UR QL STRIP.AUTO: ABNORMAL MG/DL
LDLC SERPL CALC-MCNC: 218 MG/DL (ref 0–100)
LEUKOCYTE ESTERASE UR QL STRIP.AUTO: NEGATIVE
LIPID PROFILE,FLP: ABNORMAL
LYMPHOCYTES # BLD: 1.3 K/UL (ref 0.9–3.6)
LYMPHOCYTES NFR BLD: 29 % (ref 21–52)
MCH RBC QN AUTO: 31 PG (ref 24–34)
MCHC RBC AUTO-ENTMCNC: 32.9 G/DL (ref 31–37)
MCV RBC AUTO: 94.3 FL (ref 74–97)
MONOCYTES # BLD: 0.3 K/UL (ref 0.05–1.2)
MONOCYTES NFR BLD: 6 % (ref 3–10)
NEUTS SEG # BLD: 2.7 K/UL (ref 1.8–8)
NEUTS SEG NFR BLD: 60 % (ref 40–73)
NITRITE UR QL STRIP.AUTO: NEGATIVE
PH UR STRIP: 6 [PH] (ref 5–8)
PLATELET # BLD AUTO: 239 K/UL (ref 135–420)
PMV BLD AUTO: 9.4 FL (ref 9.2–11.8)
POTASSIUM SERPL-SCNC: 4.2 MMOL/L (ref 3.5–5.5)
PROT SERPL-MCNC: 7 G/DL (ref 6.4–8.2)
PROT UR STRIP-MCNC: NEGATIVE MG/DL
RBC # BLD AUTO: 4.77 M/UL (ref 4.2–5.3)
SODIUM SERPL-SCNC: 133 MMOL/L (ref 136–145)
SP GR UR REFRACTOMETRY: 1.01 (ref 1–1.03)
T4 FREE SERPL-MCNC: 1 NG/DL (ref 0.7–1.5)
TRIGL SERPL-MCNC: 75 MG/DL (ref ?–150)
TSH SERPL DL<=0.05 MIU/L-ACNC: 3.1 UIU/ML (ref 0.36–3.74)
UROBILINOGEN UR QL STRIP.AUTO: 0.2 EU/DL (ref 0.2–1)
VLDLC SERPL CALC-MCNC: 15 MG/DL
WBC # BLD AUTO: 4.5 K/UL (ref 4.6–13.2)

## 2018-11-13 PROCEDURE — 80061 LIPID PANEL: CPT

## 2018-11-13 PROCEDURE — 84439 ASSAY OF FREE THYROXINE: CPT

## 2018-11-13 PROCEDURE — 81003 URINALYSIS AUTO W/O SCOPE: CPT

## 2018-11-13 PROCEDURE — 80048 BASIC METABOLIC PNL TOTAL CA: CPT

## 2018-11-13 PROCEDURE — 36415 COLL VENOUS BLD VENIPUNCTURE: CPT

## 2018-11-13 PROCEDURE — 85025 COMPLETE CBC W/AUTO DIFF WBC: CPT

## 2018-11-13 PROCEDURE — 80076 HEPATIC FUNCTION PANEL: CPT

## 2018-11-13 PROCEDURE — 84443 ASSAY THYROID STIM HORMONE: CPT

## 2018-11-20 ENCOUNTER — OFFICE VISIT (OUTPATIENT)
Dept: FAMILY MEDICINE CLINIC | Age: 73
End: 2018-11-20

## 2018-11-20 VITALS
BODY MASS INDEX: 23.39 KG/M2 | TEMPERATURE: 99 F | OXYGEN SATURATION: 97 % | HEIGHT: 64 IN | HEART RATE: 65 BPM | DIASTOLIC BLOOD PRESSURE: 84 MMHG | RESPIRATION RATE: 16 BRPM | WEIGHT: 137 LBS | SYSTOLIC BLOOD PRESSURE: 130 MMHG

## 2018-11-20 DIAGNOSIS — E78.00 HYPERCHOLESTEROLEMIA: Primary | ICD-10-CM

## 2018-11-20 DIAGNOSIS — Z00.00 MEDICARE ANNUAL WELLNESS VISIT, SUBSEQUENT: ICD-10-CM

## 2018-11-20 NOTE — PROGRESS NOTES
Assessment/Plan: *Diagnoses and all orders for this visit: 
 
1. Hypercholesterolemia 2. Medicare annual wellness visit, subsequent Pt still refuses statin treatment for her cholesterol. Her CT chest with calcium score was 0 which was good news. She is going to try changing out her creamer for almond milk in her coffee. The plan was discussed with the patient. The patient verbalized understanding and is in agreement with the plan. All medication potential side effects were discussed with the patient. 
 
------------------------------------------------------------------------------------------------------------------- Faustino Johns is a 68 y.o. female and presents with Annual Wellness Visit (has mammogram scheduled tomorrow , colonoscopy done last month results in chart ) Subjective: 
Pt here for f/u. HLD: about the same as last BW. She has not agreed to take any statins with us. ROS: 
Review of Systems - Negative The problem list was updated as a part of today's visit. Patient Active Problem List  
Diagnosis Code  Hypercholesterolemia E78.00 The PSH, FH were reviewed. SH: Social History Tobacco Use  Smoking status: Former Smoker Years: 2.00  Smokeless tobacco: Never Used Substance Use Topics  Alcohol use: Yes Comment: social  
 Drug use: No  
 
 
Medications/Allergies: 
Current Outpatient Medications on File Prior to Visit Medication Sig Dispense Refill  vitamin a-vitamin c-vitamin e (ANTIOXIDANT) cap Take  by mouth.  multivitamin (ONE A DAY) tablet Take 1 Tab by mouth daily.  fish oil-omega-3 fatty acids 340-1,000 mg capsule Take 1 Cap by mouth daily.  calcium-cholecalciferol, d3, (CALCIUM 600 + D) 600-125 mg-unit tab Take  by mouth.  cholecalciferol (VITAMIN D3) 1,000 unit tablet Take  by mouth daily.  glucosamine (GLUCOSAMINE RELIEF) 1,000 mg tab Take  by mouth.  ferrous sulfate (IRON) 325 mg (65 mg iron) tablet Take  by mouth Daily (before breakfast).  clobetasol (TEMOVATE) 0.05 % topical cream Apply  to affected area two (2) times a day. Use for 14 days, then stop and reassess 15 g 0 No current facility-administered medications on file prior to visit. No Known Allergies Health Maintenance:  
Health Maintenance Topic Date Due  Shingrix Vaccine Age 50> (1 of 2) 06/13/1995  MEDICARE YEARLY EXAM  11/15/2018  Influenza Age 5 to Adult  05/04/2019 (Originally 8/1/2018)  BREAST CANCER SCRN MAMMOGRAM  11/15/2019  GLAUCOMA SCREENING Q2Y  10/09/2020  
 DTaP/Tdap/Td series (2 - Td) 04/14/2021  COLONOSCOPY  10/16/2028  Hepatitis C Screening  Completed  Bone Densitometry (Dexa) Screening  Completed  Pneumococcal 65+ Low/Medium Risk  Completed Objective: 
Visit Vitals /84 (BP 1 Location: Left arm, BP Patient Position: Sitting) Pulse 65 Temp 99 °F (37.2 °C) (Oral) Resp 16 Ht 5' 4\" (1.626 m) Wt 137 lb (62.1 kg) SpO2 97% BMI 23.52 kg/m² Nurses notes and VS reviewed. Physical Examination: General appearance - alert, well appearing, and in no distress Ears - bilateral TM's and external ear canals normal 
Mouth - mucous membranes moist, pharynx normal without lesions Neck - supple, no significant adenopathy Chest - clear to auscultation, no wheezes, rales or rhonchi, symmetric air entry Heart - normal rate, regular rhythm, normal S1, S2, no murmurs, rubs, clicks or gallops Abdomen - soft, nontender, nondistended, no masses or organomegaly Musculoskeletal - no joint tenderness, deformity or swelling Extremities - peripheral pulses normal, no pedal edema, no clubbing or cyanosis Labwork and Ancillary Studies: CBC w/Diff Lab Results Component Value Date/Time  WBC 4.5 (L) 11/13/2018 09:15 AM  
 HGB 14.8 11/13/2018 09:15 AM  
 PLATELET 162 29/49/1600 09:15 AM  
  
 
 Basic Metabolic Profile Lab Results Component Value Date/Time Sodium 133 (L) 11/13/2018 09:15 AM  
 Potassium 4.2 11/13/2018 09:15 AM  
 Chloride 99 (L) 11/13/2018 09:15 AM  
 CO2 28 11/13/2018 09:15 AM  
 Anion gap 6 11/13/2018 09:15 AM  
 Glucose 92 11/13/2018 09:15 AM  
 BUN 12 11/13/2018 09:15 AM  
 Creatinine 0.77 11/13/2018 09:15 AM  
 BUN/Creatinine ratio 16 11/13/2018 09:15 AM  
 GFR est AA >60 11/13/2018 09:15 AM  
 GFR est non-AA >60 11/13/2018 09:15 AM  
 Calcium 9.0 11/13/2018 09:15 AM  
  
  
LFT Lab Results Component Value Date/Time ALT (SGPT) 26 11/13/2018 09:15 AM  
 AST (SGOT) 12 (L) 11/13/2018 09:15 AM  
 Alk. phosphatase 73 11/13/2018 09:15 AM  
 Bilirubin, direct 0.1 11/13/2018 09:15 AM  
 Bilirubin, total 0.5 11/13/2018 09:15 AM  
 
 
 
Cholesterol Lab Results Component Value Date/Time  Cholesterol, total 296 (H) 11/13/2018 09:15 AM  
 HDL Cholesterol 63 (H) 11/13/2018 09:15 AM  
 LDL, calculated 218 (H) 11/13/2018 09:15 AM  
 Triglyceride 75 11/13/2018 09:15 AM  
 CHOL/HDL Ratio 4.7 11/13/2018 09:15 AM

## 2018-11-20 NOTE — PROGRESS NOTES
This is the Subsequent Medicare Annual Wellness Exam, performed 12 months or more after the Initial AWV or the last Subsequent AWV I have reviewed the patient's medical history in detail and updated the computerized patient record. History Past Medical History:  
Diagnosis Date  Hypercholesterolemia Past Surgical History:  
Procedure Laterality Date  HX GYN    
 vaginal partial hysterectomy  HX PARTIAL THYROIDECTOMY  HX TONSILLECTOMY Current Outpatient Medications Medication Sig Dispense Refill  vitamin a-vitamin c-vitamin e (ANTIOXIDANT) cap Take  by mouth.  multivitamin (ONE A DAY) tablet Take 1 Tab by mouth daily.  fish oil-omega-3 fatty acids 340-1,000 mg capsule Take 1 Cap by mouth daily.  calcium-cholecalciferol, d3, (CALCIUM 600 + D) 600-125 mg-unit tab Take  by mouth.  cholecalciferol (VITAMIN D3) 1,000 unit tablet Take  by mouth daily.  glucosamine (GLUCOSAMINE RELIEF) 1,000 mg tab Take  by mouth.  ferrous sulfate (IRON) 325 mg (65 mg iron) tablet Take  by mouth Daily (before breakfast).  clobetasol (TEMOVATE) 0.05 % topical cream Apply  to affected area two (2) times a day. Use for 14 days, then stop and reassess 15 g 0 No Known Allergies Family History Problem Relation Age of Onset  Hypertension Mother  Heart Disease Father Social History Tobacco Use  Smoking status: Former Smoker Years: 2.00  Smokeless tobacco: Never Used Substance Use Topics  Alcohol use: Yes Comment: social  
 
Patient Active Problem List  
Diagnosis Code  Hypercholesterolemia E78.00 Depression Risk Factor Screening: PHQ over the last two weeks 11/20/2018 Little interest or pleasure in doing things Not at all Feeling down, depressed, irritable, or hopeless Not at all Total Score PHQ 2 0 Alcohol Risk Factor Screening: You do not drink alcohol or very rarely. Functional Ability and Level of Safety:  
Hearing Loss Hearing is good. Activities of Daily Living The home contains: no safety equipment. Patient does total self care Fall Risk Fall Risk Assessment, last 12 mths 11/20/2018 Able to walk? Yes Fall in past 12 months? Yes Fall with injury? Yes  
Number of falls in past 12 months 1 Fall Risk Score 2 Abuse Screen Patient is not abused Cognitive Screening Evaluation of Cognitive Function: 
Has your family/caregiver stated any concerns about your memory: no 
Normal 
 
Patient Care Team  
Patient Care Team: 
Ramon Bolanos MD as PCP - General (Internal Medicine) Nasrin Lopez MD (Dermatology) Alexey Alcaraz MD (Optometry) Assessment/Plan Education and counseling provided: 
Are appropriate based on today's review and evaluation Diagnoses and all orders for this visit: 
 
1. Medicare annual wellness visit, subsequent 2. Hypercholesterolemia Health Maintenance Due Topic Date Due  Shingrix Vaccine Age 50> (1 of 2) 06/13/1995  MEDICARE YEARLY EXAM  11/15/2018

## 2018-11-20 NOTE — PATIENT INSTRUCTIONS
Medicare Wellness Visit, Female The best way to live healthy is to have a lifestyle where you eat a well-balanced diet, exercise regularly, limit alcohol use, and quit all forms of tobacco/nicotine, if applicable. Regular preventive services are another way to keep healthy. Preventive services (vaccines, screening tests, monitoring & exams) can help personalize your care plan, which helps you manage your own care. Screening tests can find health problems at the earliest stages, when they are easiest to treat. [facility] follows the current, evidence-based guidelines published by the [facility] Azeem Heber ([facility]) when recommending preventive services for our patients. Because we follow these guidelines, sometimes recommendations change over time as research supports it. (For example, mammograms used to be recommended annually. Even though Medicare will still pay for an annual mammogram, the newer guidelines recommend a mammogram every two years for women of average risk.) Of course, you and your doctor may decide to screen more often for some diseases, based on your risk and your health status. Preventive services for you include: - Medicare offers their members a free annual wellness visit, which is time for you and your primary care provider to discuss and plan for your preventive service needs. Take advantage of this benefit every year! 
-All adults over the age of 72 should receive the recommended pneumonia vaccines. Current USPSTF guidelines recommend a series of two vaccines for the best pneumonia protection.  
-All adults should have a flu vaccine yearly and a tetanus vaccine every 10 years. All adults age 61 and older should receive a shingles vaccine once in their lifetime.   
-A bone mass density test is recommended when a woman turns 65 to screen for osteoporosis. This test is only recommended one time, as a screening. Some providers will use this same test as a disease monitoring tool if you already have osteoporosis. -All adults age 38-68 who are overweight should have a diabetes screening test once every three years.  
-Other screening tests and preventive services for persons with diabetes include: an eye exam to screen for diabetic retinopathy, a kidney function test, a foot exam, and stricter control over your cholesterol.  
-Cardiovascular screening for adults with routine risk involves an electrocardiogram (ECG) at intervals determined by your doctor.  
-Colorectal cancer screenings should be done for adults age 54-65 with no increased risk factors for colorectal cancer. There are a number of acceptable methods of screening for this type of cancer. Each test has its own benefits and drawbacks. Discuss with your doctor what is most appropriate for you during your annual wellness visit. The different tests include: colonoscopy (considered the best screening method), a fecal occult blood test, a fecal DNA test, and sigmoidoscopy. -Breast cancer screenings are recommended every other year for women of normal risk, age 54-69. 
-Cervical cancer screenings for women over age 72 are only recommended with certain risk factors.  
-All adults born between [facility] should be screened once for Hepatitis C. Here is a list of your current Health Maintenance items (your personalized list of preventive services) with a due date: 
Health Maintenance Due Topic Date Due  Shingles Vaccine (1 of 2) 06/13/1995  Glaucoma Screening   10/04/2018 [facility] Annual Well Visit  11/15/2018

## 2018-11-20 NOTE — PROGRESS NOTES
Moo Aguilar is a 68 y.o. female (: 1945) presenting to address: Chief Complaint Patient presents with  Annual Wellness Visit  
  has mammogram scheduled tomorrow , colonoscopy done last month results in chart Vitals:  
 18 1014 BP: 130/84 Pulse: 65 Resp: 16 Temp: 99 °F (37.2 °C) TempSrc: Oral  
SpO2: 97% Weight: 137 lb (62.1 kg) Height: 5' 4\" (1.626 m) PainSc:   0 - No pain Hearing/Vision:  
 
 Visual Acuity Screening Right eye Left eye Both eyes Without correction: 20/20 20/40  20/20 With correction:     
 
 
Learning Assessment:  
 
Learning Assessment 2015 PRIMARY LEARNER Patient HIGHEST LEVEL OF EDUCATION - PRIMARY LEARNER  4 YEARS OF COLLEGE  
BARRIERS PRIMARY LEARNER NONE  
CO-LEARNER CAREGIVER No  
PRIMARY LANGUAGE ENGLISH  
LEARNER PREFERENCE PRIMARY DEMONSTRATION  
ANSWERED BY self RELATIONSHIP SELF Depression Screening: PHQ over the last two weeks 2018 Little interest or pleasure in doing things Not at all Feeling down, depressed, irritable, or hopeless Not at all Total Score PHQ 2 0 Fall Risk Assessment:  
 
Fall Risk Assessment, last 12 mths 2018 Able to walk? Yes Fall in past 12 months? Yes Fall with injury? Yes  
Number of falls in past 12 months 1 Fall Risk Score 2 Abuse Screening:  
 
Abuse Screening Questionnaire 2017 Do you ever feel afraid of your partner? Cosimo Blow Are you in a relationship with someone who physically or mentally threatens you? Cosimo Blow Is it safe for you to go home? Claudette Dunks Coordination of Care Questionaire: 1. Have you been to the ER, urgent care clinic since your last visit? Hospitalized since your last visit? NO 
 
2. Have you seen or consulted any other health care providers outside of the [address] [place] [address] since your last visit? Include any pap smears or colon screening. YES GI for Colonoscopy Advanced Directive: 1. Do you have an Advanced Directive? NO 
 
2. Would you like information on Advanced Directives? YES copy given for patient to review

## 2019-08-09 ENCOUNTER — TELEPHONE (OUTPATIENT)
Dept: FAMILY MEDICINE CLINIC | Age: 74
End: 2019-08-09

## 2019-08-09 DIAGNOSIS — E55.9 HYPOVITAMINOSIS D: ICD-10-CM

## 2019-08-09 DIAGNOSIS — E78.00 HYPERCHOLESTEROLEMIA: Primary | ICD-10-CM

## 2019-08-09 NOTE — TELEPHONE ENCOUNTER
Pt is scheduled for an upcoming appt for a MWV . Pt would like to come in before for the labs. Please review their chart and order labs accordingly.      Future Appointments   Date Time Provider Marcia Hurtado   11/26/2019 10:15 AM Daniel Celestin MD [facility]

## 2019-11-19 ENCOUNTER — HOSPITAL ENCOUNTER (OUTPATIENT)
Dept: LAB | Age: 74
Discharge: HOME | End: 2019-11-19
Payer: MEDICARE

## 2019-11-19 DIAGNOSIS — E78.00 HYPERCHOLESTEROLEMIA: ICD-10-CM

## 2019-11-19 LAB
ALBUMIN SERPL-MCNC: 4.1 G/DL (ref 3.4–5)
ALBUMIN/GLOB SERPL: 1.3 {RATIO} (ref 0.8–1.7)
ALP SERPL-CCNC: 67 U/L (ref 45–117)
ALT SERPL-CCNC: 31 U/L (ref 13–56)
ANION GAP SERPL CALC-SCNC: 4 MMOL/L (ref 3–18)
APPEARANCE UR: CLEAR
AST SERPL-CCNC: 8 U/L (ref 10–38)
BASOPHILS # BLD: 0.1 K/UL (ref 0–0.1)
BASOPHILS NFR BLD: 1 % (ref 0–2)
BILIRUB DIRECT SERPL-MCNC: 0.2 MG/DL (ref 0–0.2)
BILIRUB SERPL-MCNC: 0.5 MG/DL (ref 0.2–1)
BILIRUB UR QL: NEGATIVE
BUN SERPL-MCNC: 18 MG/DL (ref 7–18)
BUN/CREAT SERPL: 20 (ref 12–20)
CALCIUM SERPL-MCNC: 9.5 MG/DL (ref 8.5–10.1)
CHLORIDE SERPL-SCNC: 106 MMOL/L (ref 100–111)
CHOLEST SERPL-MCNC: 330 MG/DL
CO2 SERPL-SCNC: 30 MMOL/L (ref 21–32)
COLOR UR: YELLOW
CREAT SERPL-MCNC: 0.88 MG/DL (ref 0.6–1.3)
DIFFERENTIAL METHOD BLD: ABNORMAL
EOSINOPHIL # BLD: 0.2 K/UL (ref 0–0.4)
EOSINOPHIL NFR BLD: 4 % (ref 0–5)
ERYTHROCYTE [DISTWIDTH] IN BLOOD BY AUTOMATED COUNT: 13.6 % (ref 11.6–14.5)
GLOBULIN SER CALC-MCNC: 3.1 G/DL (ref 2–4)
GLUCOSE SERPL-MCNC: 92 MG/DL (ref 74–99)
GLUCOSE UR STRIP.AUTO-MCNC: NEGATIVE MG/DL
HCT VFR BLD AUTO: 47.3 % (ref 35–45)
HDLC SERPL-MCNC: 87 MG/DL (ref 40–60)
HDLC SERPL: 3.8 {RATIO} (ref 0–5)
HGB BLD-MCNC: 15.3 G/DL (ref 12–16)
HGB UR QL STRIP: NEGATIVE
KETONES UR QL STRIP.AUTO: NEGATIVE MG/DL
LDLC SERPL CALC-MCNC: 230.6 MG/DL (ref 0–100)
LEUKOCYTE ESTERASE UR QL STRIP.AUTO: NEGATIVE
LIPID PROFILE,FLP: ABNORMAL
LYMPHOCYTES # BLD: 1.4 K/UL (ref 0.9–3.6)
LYMPHOCYTES NFR BLD: 28 % (ref 21–52)
MCH RBC QN AUTO: 31 PG (ref 24–34)
MCHC RBC AUTO-ENTMCNC: 32.3 G/DL (ref 31–37)
MCV RBC AUTO: 95.7 FL (ref 74–97)
MONOCYTES # BLD: 0.4 K/UL (ref 0.05–1.2)
MONOCYTES NFR BLD: 9 % (ref 3–10)
NEUTS SEG # BLD: 2.9 K/UL (ref 1.8–8)
NEUTS SEG NFR BLD: 58 % (ref 40–73)
NITRITE UR QL STRIP.AUTO: NEGATIVE
PH UR STRIP: 5 [PH] (ref 5–8)
PLATELET # BLD AUTO: 250 K/UL (ref 135–420)
PMV BLD AUTO: 9.2 FL (ref 9.2–11.8)
POTASSIUM SERPL-SCNC: 4.3 MMOL/L (ref 3.5–5.5)
PROT SERPL-MCNC: 7.2 G/DL (ref 6.4–8.2)
PROT UR STRIP-MCNC: NEGATIVE MG/DL
RBC # BLD AUTO: 4.94 M/UL (ref 4.2–5.3)
SODIUM SERPL-SCNC: 140 MMOL/L (ref 136–145)
SP GR UR REFRACTOMETRY: 1.02 (ref 1–1.03)
T4 FREE SERPL-MCNC: 0.8 NG/DL (ref 0.7–1.5)
TRIGL SERPL-MCNC: 62 MG/DL (ref ?–150)
TSH SERPL DL<=0.05 MIU/L-ACNC: 2.85 UIU/ML (ref 0.36–3.74)
UROBILINOGEN UR QL STRIP.AUTO: 0.2 EU/DL (ref 0.2–1)
VLDLC SERPL CALC-MCNC: 12.4 MG/DL
WBC # BLD AUTO: 5 K/UL (ref 4.6–13.2)

## 2019-11-19 PROCEDURE — 80048 BASIC METABOLIC PNL TOTAL CA: CPT

## 2019-11-19 PROCEDURE — 81003 URINALYSIS AUTO W/O SCOPE: CPT

## 2019-11-19 PROCEDURE — 85025 COMPLETE CBC W/AUTO DIFF WBC: CPT

## 2019-11-19 PROCEDURE — 36415 COLL VENOUS BLD VENIPUNCTURE: CPT

## 2019-11-19 PROCEDURE — 80076 HEPATIC FUNCTION PANEL: CPT

## 2019-11-19 PROCEDURE — 84439 ASSAY OF FREE THYROXINE: CPT

## 2019-11-19 PROCEDURE — 84443 ASSAY THYROID STIM HORMONE: CPT

## 2019-11-19 PROCEDURE — 80061 LIPID PANEL: CPT

## 2019-11-26 ENCOUNTER — OFFICE VISIT (OUTPATIENT)
Dept: FAMILY MEDICINE CLINIC | Age: 74
End: 2019-11-26

## 2019-11-26 VITALS
TEMPERATURE: 96.6 F | DIASTOLIC BLOOD PRESSURE: 65 MMHG | HEIGHT: 64 IN | RESPIRATION RATE: 16 BRPM | HEART RATE: 68 BPM | BODY MASS INDEX: 21.85 KG/M2 | SYSTOLIC BLOOD PRESSURE: 129 MMHG | WEIGHT: 128 LBS | OXYGEN SATURATION: 100 %

## 2019-11-26 DIAGNOSIS — R19.7 WATERY DIARRHEA: ICD-10-CM

## 2019-11-26 DIAGNOSIS — R19.7 WATERY DIARRHEA: Primary | ICD-10-CM

## 2019-11-26 DIAGNOSIS — Z00.00 MEDICARE ANNUAL WELLNESS VISIT, SUBSEQUENT: ICD-10-CM

## 2019-11-26 DIAGNOSIS — H60.543 DERMATITIS OF EAR CANAL, BILATERAL: ICD-10-CM

## 2019-11-26 DIAGNOSIS — E78.00 HYPERCHOLESTEROLEMIA: ICD-10-CM

## 2019-11-26 RX ORDER — FLUOCINOLONE ACETONIDE 0.11 MG/ML
OIL AURICULAR (OTIC)
Qty: 20 ML | Refills: 1 | Status: SHIPPED | OUTPATIENT
Start: 2019-11-26 | End: 2020-12-01 | Stop reason: ALTCHOICE

## 2019-11-26 NOTE — PROGRESS NOTES
Octavio Delgadillo is a 76 y.o. female (: 1945) presenting to address:    Chief Complaint   Patient presents with    Annual Wellness Visit    Skin Problem     itching in ears and armpits     Diarrhea     last 2 to 3 weeks        Vitals:    19 1017   BP: 129/65   Pulse: 68   Resp: 16   Temp: 96.6 °F (35.9 °C)   TempSrc: Oral   SpO2: 100%   Weight: 128 lb (58.1 kg)   Height: 5' 4\" (1.626 m)   PainSc:   0 - No pain       Hearing/Vision:   No exam data present    Learning Assessment:     Learning Assessment 2015   PRIMARY LEARNER Patient   HIGHEST LEVEL OF EDUCATION - PRIMARY LEARNER  4 YEARS OF COLLEGE   BARRIERS PRIMARY LEARNER NONE   CO-LEARNER CAREGIVER No   PRIMARY LANGUAGE ENGLISH   LEARNER PREFERENCE PRIMARY DEMONSTRATION   ANSWERED BY self   RELATIONSHIP SELF     Depression Screening:     3 most recent PHQ Screens 2019   Little interest or pleasure in doing things Not at all   Feeling down, depressed, irritable, or hopeless Not at all   Total Score PHQ 2 0     Fall Risk Assessment:     Fall Risk Assessment, last 12 mths 2019   Able to walk? Yes   Fall in past 12 months? No   Fall with injury? -   Number of falls in past 12 months -   Fall Risk Score -     Abuse Screening:     Abuse Screening Questionnaire 2017   Do you ever feel afraid of your partner? N   Are you in a relationship with someone who physically or mentally threatens you? N   Is it safe for you to go home? Y     Coordination of Care Questionaire:   1. Have you been to the ER, urgent care clinic since your last visit? Hospitalized since your last visit? NO    2. Have you seen or consulted any other health care providers outside of the [address] [place] [address] since your last visit? Include any pap smears or colon screening. NO    Advanced Directive:   1. Do you have an Advanced Directive? Yes     2. Would you like information on Advanced Directives?  NO

## 2019-11-26 NOTE — PROGRESS NOTES
Assessment/Plan:    *Diagnoses and all orders for this visit:    1. Watery diarrhea  -     CULTURE, STOOL; Future  -     WBC, STOOL; Future  -     OCCULT BLOOD, STOOL; Future  -     C DIFFICILE TOXIN GENE, CHERYL; Future  -     OVA+PARASITE + GIARDIA; Future    2. Dermatitis of ear canal, bilateral  -     fluocinolone acetonide oil (DERMOTIC OIL) 0.01 % drop; Place 2 drops in each ear twice daily x 7 days then stop. Repeat as needed. 3. Medicare annual wellness visit, subsequent    4. Hypercholesterolemia        Will increase exercise. Will return in 3 mon for lipid check. Will await results of above. She will add Shakley fiber powder 1-2 times daily. If this does not help bulk up stool and if above w/u is neg, will refer back to GI. The plan was discussed with the patient. The patient verbalized understanding and is in agreement with the plan. All medication potential side effects were discussed with the patient.    -------------------------------------------------------------------------------------------------------------------        Geovany Kiran is a 76 y.o. female and presents with Annual Wellness Visit; Skin Problem (itching in ears and armpits ); and Diarrhea (last 2 to 3 weeks )         Subjective: For the last 2 months, having watery diarrhea, no fevers, no abd pain, no blood noted in stool. She has not changed anything in her diet, has not travelled. She has ahd a colonoscopy in the past 1 year. HLD:  Has increased even more. She still will not start meds. She has a clean diet and exercises 6 days a week. Has been having itchy ear canals, no pain or discharge. ROS:  Review of Systems - Negative except as above        The problem list was updated as a part of today's visit. Patient Active Problem List   Diagnosis Code    Hypercholesterolemia E78.00       The PSH, FH were reviewed.         SH:  Social History     Tobacco Use    Smoking status: Former Smoker     Years: 2.00    Smokeless tobacco: Never Used   Substance Use Topics    Alcohol use: Yes     Comment: social    Drug use: No       Medications/Allergies:  Current Outpatient Medications on File Prior to Visit   Medication Sig Dispense Refill    vitamin a-vitamin c-vitamin e (ANTIOXIDANT) cap Take  by mouth.  multivitamin (ONE A DAY) tablet Take 1 Tab by mouth daily.  fish oil-omega-3 fatty acids 340-1,000 mg capsule Take 1 Cap by mouth daily.  calcium-cholecalciferol, d3, (CALCIUM 600 + D) 600-125 mg-unit tab Take  by mouth.  cholecalciferol (VITAMIN D3) 1,000 unit tablet Take  by mouth daily.  glucosamine (GLUCOSAMINE RELIEF) 1,000 mg tab Take  by mouth.  ferrous sulfate (IRON) 325 mg (65 mg iron) tablet Take  by mouth Daily (before breakfast).  clobetasol (TEMOVATE) 0.05 % topical cream Apply  to affected area two (2) times a day. Use for 14 days, then stop and reassess 15 g 0     No current facility-administered medications on file prior to visit. No Known Allergies      Health Maintenance:   Health Maintenance   Topic Date Due    Shingrix Vaccine Age 49> (1 of 2) 06/13/1995    MEDICARE YEARLY EXAM  11/21/2019    Influenza Age 5 to Adult  03/31/2020 (Originally 8/1/2019)    GLAUCOMA SCREENING Q2Y  10/09/2020    BREAST CANCER SCRN MAMMOGRAM  11/21/2020    DTaP/Tdap/Td series (2 - Td) 04/14/2021    COLONOSCOPY  10/16/2028    Hepatitis C Screening  Completed    Bone Densitometry (Dexa) Screening  Completed    Pneumococcal 65+ years  Completed       Objective:  Visit Vitals  /65   Pulse 68   Temp 96.6 °F (35.9 °C) (Oral)   Resp 16   Ht 5' 4\" (1.626 m)   Wt 128 lb (58.1 kg)   SpO2 100%   BMI 21.97 kg/m²          Nurses notes and VS reviewed.       Physical Examination: General appearance - alert, well appearing, and in no distress  Ears - not examined, lost power in the building so otoscope did not have light and could not see ear canal.  Mouth - mucous membranes moist, pharynx normal without lesions  Neck - supple, no significant adenopathy  Chest - clear to auscultation, no wheezes, rales or rhonchi, symmetric air entry  Heart - normal rate, regular rhythm, normal S1, S2, no murmurs, rubs, clicks or gallops  Abdomen - soft, nontender, nondistended, no masses or organomegaly  Musculoskeletal - no joint tenderness, deformity or swelling  Extremities - peripheral pulses normal, no pedal edema, no clubbing or cyanosis          Labwork and Ancillary Studies:    CBC w/Diff  Lab Results   Component Value Date/Time    WBC 5.0 11/19/2019 10:05 AM    HGB 15.3 11/19/2019 10:05 AM    PLATELET 908 42/15/6307 10:05 AM         Basic Metabolic Profile  Lab Results   Component Value Date/Time    Sodium 140 11/19/2019 10:05 AM    Potassium 4.3 11/19/2019 10:05 AM    Chloride 106 11/19/2019 10:05 AM    CO2 30 11/19/2019 10:05 AM    Anion gap 4 11/19/2019 10:05 AM    Glucose 92 11/19/2019 10:05 AM    BUN 18 11/19/2019 10:05 AM    Creatinine 0.88 11/19/2019 10:05 AM    BUN/Creatinine ratio 20 11/19/2019 10:05 AM    GFR est AA >60 11/19/2019 10:05 AM    GFR est non-AA >60 11/19/2019 10:05 AM    Calcium 9.5 11/19/2019 10:05 AM         LFT  Lab Results   Component Value Date/Time    ALT (SGPT) 31 11/19/2019 10:05 AM    AST (SGOT) 8 (L) 11/19/2019 10:05 AM    Alk.  phosphatase 67 11/19/2019 10:05 AM    Bilirubin, direct 0.2 11/19/2019 10:05 AM    Bilirubin, total 0.5 11/19/2019 10:05 AM         Cholesterol  Lab Results   Component Value Date/Time    Cholesterol, total 330 (H) 11/19/2019 10:05 AM    HDL Cholesterol 87 (H) 11/19/2019 10:05 AM    LDL, calculated 230.6 (H) 11/19/2019 10:05 AM    Triglyceride 62 11/19/2019 10:05 AM    CHOL/HDL Ratio 3.8 11/19/2019 10:05 AM

## 2019-11-26 NOTE — PATIENT INSTRUCTIONS
Medicare Wellness Visit, Female The best way to live healthy is to have a lifestyle where you eat a well-balanced diet, exercise regularly, limit alcohol use, and quit all forms of tobacco/nicotine, if applicable. Regular preventive services are another way to keep healthy. Preventive services (vaccines, screening tests, monitoring & exams) can help personalize your care plan, which helps you manage your own care. Screening tests can find health problems at the earliest stages, when they are easiest to treat. [facility] follows the current, evidence-based guidelines published by the [facility] Azeem Buck ([facility]) when recommending preventive services for our patients. Because we follow these guidelines, sometimes recommendations change over time as research supports it. (For example, mammograms used to be recommended annually. Even though Medicare will still pay for an annual mammogram, the newer guidelines recommend a mammogram every two years for women of average risk). Of course, you and your doctor may decide to screen more often for some diseases, based on your risk and your co-morbidities (chronic disease you are already diagnosed with). Preventive services for you include: - Medicare offers their members a free annual wellness visit, which is time for you and your primary care provider to discuss and plan for your preventive service needs. Take advantage of this benefit every year! 
-All adults over the age of 72 should receive the recommended pneumonia vaccines. Current USPSTF guidelines recommend a series of two vaccines for the best pneumonia protection.  
-All adults should have a flu vaccine yearly and a tetanus vaccine every 10 years.  
-All adults age 48 and older should receive the shingles vaccines (series of two vaccines). -All adults age 38-68 who are overweight should have a diabetes screening test once every three years. -All adults born between [birth year] and 1965 should be screened once for Hepatitis C. 
-Other screening tests and preventive services for persons with diabetes include: an eye exam to screen for diabetic retinopathy, a kidney function test, a foot exam, and stricter control over your cholesterol.  
-Cardiovascular screening for adults with routine risk involves an electrocardiogram (ECG) at intervals determined by your doctor.  
-Colorectal cancer screenings should be done for adults age 54-65 with no increased risk factors for colorectal cancer. There are a number of acceptable methods of screening for this type of cancer. Each test has its own benefits and drawbacks. Discuss with your doctor what is most appropriate for you during your annual wellness visit. The different tests include: colonoscopy (considered the best screening method), a fecal occult blood test, a fecal DNA test, and sigmoidoscopy. 
 
-A bone mass density test is recommended when a woman turns 65 to screen for osteoporosis. This test is only recommended one time, as a screening. Some providers will use this same test as a disease monitoring tool if you already have osteoporosis. -Breast cancer screenings are recommended every other year for women of normal risk, age 54-69. 
-Cervical cancer screenings for women over age 72 are only recommended with certain risk factors. Here is a list of your current Health Maintenance items (your personalized list of preventive services) with a due date: 
Health Maintenance Due Topic Date Due  Shingles Vaccine (1 of 2) 06/13/1995 [place] Annual Well Visit  11/21/2019

## 2019-11-26 NOTE — PROGRESS NOTES
This is the Subsequent Medicare Annual Wellness Exam, performed 12 months or more after the Initial AWV or the last Subsequent AWV    I have reviewed the patient's medical history in detail and updated the computerized patient record. History     Patient Active Problem List   Diagnosis Code    Hypercholesterolemia E78.00     Past Medical History:   Diagnosis Date    Hypercholesterolemia       Past Surgical History:   Procedure Laterality Date    HX GYN      vaginal partial hysterectomy    HX PARTIAL THYROIDECTOMY      HX TONSILLECTOMY       Current Outpatient Medications   Medication Sig Dispense Refill    fluocinolone acetonide oil (DERMOTIC OIL) 0.01 % drop Place 2 drops in each ear twice daily x 7 days then stop. Repeat as needed. 20 mL 1    vitamin a-vitamin c-vitamin e (ANTIOXIDANT) cap Take  by mouth.  multivitamin (ONE A DAY) tablet Take 1 Tab by mouth daily.  fish oil-omega-3 fatty acids 340-1,000 mg capsule Take 1 Cap by mouth daily.  calcium-cholecalciferol, d3, (CALCIUM 600 + D) 600-125 mg-unit tab Take  by mouth.  cholecalciferol (VITAMIN D3) 1,000 unit tablet Take  by mouth daily.  glucosamine (GLUCOSAMINE RELIEF) 1,000 mg tab Take  by mouth.  ferrous sulfate (IRON) 325 mg (65 mg iron) tablet Take  by mouth Daily (before breakfast).  clobetasol (TEMOVATE) 0.05 % topical cream Apply  to affected area two (2) times a day.  Use for 14 days, then stop and reassess 15 g 0     No Known Allergies    Family History   Problem Relation Age of Onset    Hypertension Mother     Heart Disease Father      Social History     Tobacco Use    Smoking status: Former Smoker     Years: 2.00    Smokeless tobacco: Never Used   Substance Use Topics    Alcohol use: Yes     Comment: social       Depression Risk Factor Screening:     3 most recent PHQ Screens 11/26/2019   Little interest or pleasure in doing things Not at all   Feeling down, depressed, irritable, or hopeless Not at all   Total Score PHQ 2 0       Alcohol Risk Factor Screening:   Do you average 1 drink per night or more than 7 drinks a week:  No    On any one occasion in the past three months have you have had more than 3 drinks containing alcohol:  No      Functional Ability and Level of Safety:   Hearing: Hearing is good. Activities of Daily Living: The home contains: no safety equipment. Patient does total self care    Ambulation: with no difficulty    Fall Risk:  Fall Risk Assessment, last 12 mths 11/26/2019   Able to walk? Yes   Fall in past 12 months? No   Fall with injury? -   Number of falls in past 12 months -   Fall Risk Score -       Abuse Screen:  Patient is not abused    Cognitive Screening   Has your family/caregiver stated any concerns about your memory: no      Patient Care Team   Patient Care Team:  Eliceo Odom MD as PCP - General (Internal Medicine)  Eliceo Odom MD as PCP - [facility] Provider  Dora Lee MD (Dermatology)  Addie Reardon MD (Optometry)    Assessment/Plan   Education and counseling provided:  Are appropriate based on today's review and evaluation    Diagnoses and all orders for this visit:    1. Medicare annual wellness visit, subsequent    2. Watery diarrhea  -     CULTURE, STOOL; Future  -     WBC, STOOL; Future  -     OCCULT BLOOD, STOOL; Future  -     C DIFFICILE TOXIN GENE, CHERYL; Future  -     OVA+PARASITE + GIARDIA; Future    3. Dermatitis of ear canal, bilateral  -     fluocinolone acetonide oil (DERMOTIC OIL) 0.01 % drop; Place 2 drops in each ear twice daily x 7 days then stop. Repeat as needed.     4. Hypercholesterolemia        Health Maintenance Due   Topic Date Due    Shingrix Vaccine Age 49> (1 of 2) 06/13/1995    MEDICARE YEARLY EXAM  11/21/2019

## 2019-11-27 ENCOUNTER — HOSPITAL ENCOUNTER (OUTPATIENT)
Dept: LAB | Age: 74
Discharge: HOME | End: 2019-11-27
Payer: MEDICARE

## 2019-11-27 DIAGNOSIS — R19.7 WATERY DIARRHEA: ICD-10-CM

## 2019-11-27 LAB — HEMOCCULT STL QL: NEGATIVE

## 2019-11-27 PROCEDURE — 87045 FECES CULTURE AEROBIC BACT: CPT

## 2019-11-27 PROCEDURE — 87493 C DIFF AMPLIFIED PROBE: CPT

## 2019-11-27 PROCEDURE — 82272 OCCULT BLD FECES 1-3 TESTS: CPT

## 2019-11-27 PROCEDURE — 87329 GIARDIA AG IA: CPT

## 2019-11-27 PROCEDURE — 87186 SC STD MICRODIL/AGAR DIL: CPT

## 2019-11-27 PROCEDURE — 89055 LEUKOCYTE ASSESSMENT FECAL: CPT

## 2019-11-28 LAB
C DIFF TOX GENS STL QL NAA+PROBE: NEGATIVE
SPECIMEN SOURCE: NORMAL
WBC #/AREA STL HPF: NORMAL /HPF

## 2019-11-30 LAB
BACTERIA SPEC CULT: NORMAL
SERVICE CMNT-IMP: NORMAL

## 2019-12-05 LAB
G LAMBLIA AG STL QL IA: NEGATIVE
O+P STL CONC: NORMAL
SPECIMEN SOURCE: NORMAL

## 2019-12-05 NOTE — PROGRESS NOTES
Notify pt that her stool tests so far are negative. Has she added the fiber as we discussed? Is it helping? If diarrhea is not improving, we will refer her back to GI.  (get name of her GI).

## 2019-12-05 NOTE — PROGRESS NOTES
She had her colonoscopy with Dr. Avis Levi. I put in a referral for her if she needs it. If the fiber has helped sufficiently then, we can just monitor how this works long term.

## 2019-12-05 NOTE — PROGRESS NOTES
Patient notified of results . Adding fiber has made some improvement her her Diarrhea .  She doesn't recall the Name of the [address] [place] [address] [address] Dr Mendoza

## 2019-12-13 LAB
O&P RESULT 1, 080877: NORMAL
SOURCE, RSRC56: NORMAL

## 2020-01-14 ENCOUNTER — OFFICE VISIT (OUTPATIENT)
Dept: FAMILY MEDICINE CLINIC | Age: 75
End: 2020-01-14

## 2020-01-14 VITALS
TEMPERATURE: 98.7 F | HEART RATE: 83 BPM | DIASTOLIC BLOOD PRESSURE: 73 MMHG | BODY MASS INDEX: 22.81 KG/M2 | HEIGHT: 64 IN | RESPIRATION RATE: 20 BRPM | WEIGHT: 133.6 LBS | SYSTOLIC BLOOD PRESSURE: 125 MMHG | OXYGEN SATURATION: 99 %

## 2020-01-14 DIAGNOSIS — J20.9 ACUTE BRONCHITIS, UNSPECIFIED ORGANISM: Primary | ICD-10-CM

## 2020-01-14 RX ORDER — PREDNISONE 20 MG/1
TABLET ORAL
Qty: 6 TAB | Refills: 0 | Status: SHIPPED | OUTPATIENT
Start: 2020-01-14 | End: 2020-12-01 | Stop reason: ALTCHOICE

## 2020-01-14 RX ORDER — AMOXICILLIN AND CLAVULANATE POTASSIUM 875; 125 MG/1; MG/1
1 TABLET, FILM COATED ORAL EVERY 12 HOURS
Qty: 20 TAB | Refills: 0 | Status: SHIPPED | OUTPATIENT
Start: 2020-01-14 | End: 2020-12-01 | Stop reason: ALTCHOICE

## 2020-01-14 NOTE — PROGRESS NOTES
Cristian Khoury presents today for   Chief Complaint   Patient presents with    Cough     x \"several days\"       Is someone accompanying this pt? no    Is the patient using any DME equipment during OV? no    Depression Screening:  3 most recent PHQ Screens 11/26/2019   Little interest or pleasure in doing things Not at all   Feeling down, depressed, irritable, or hopeless Not at all   Total Score PHQ 2 0       Learning Assessment:  Learning Assessment 1/14/2020   PRIMARY LEARNER Patient   HIGHEST LEVEL OF EDUCATION - PRIMARY LEARNER  4 YEARS [employer] LEARNER -   CO-LEARNER CAREGIVER -   PRIMARY LANGUAGE ENGLISH   LEARNER PREFERENCE PRIMARY DEMONSTRATION     READING     VIDEOS     LISTENING     PICTURES   ANSWERED BY patient   RELATIONSHIP SELF       Abuse Screening:  Abuse Screening Questionnaire 1/14/2020   Do you ever feel afraid of your partner? N   Are you in a relationship with someone who physically or mentally threatens you? N   Is it safe for you to go home? Y       Fall Risk  Fall Risk Assessment, last 12 mths 11/26/2019   Able to walk? Yes   Fall in past 12 months? No   Fall with injury? -   Number of falls in past 12 months -   Fall Risk Score -       Health Maintenance reviewed and discussed and ordered per Provider. Health Maintenance Due   Topic Date Due    Shingrix Vaccine Age 49> (1 of 2) 06/13/1995   . Pt currently taking Antiplatelet therapy? no    Coordination of Care:  1. Have you been to the ER, urgent care clinic since your last visit? Hospitalized since your last visit? no    2. Have you seen or consulted any other health care providers outside of the [address] [place] [address] since your last visit? Include any pap smears or colon screening.  no

## 2020-01-14 NOTE — PROGRESS NOTES
Viri Benson     Chief Complaint   Patient presents with    Cough     x \"several days\"     Vitals:    01/14/20 0913   BP: 125/73   Pulse: 83   Resp: 20   Temp: 98.7 °F (37.1 °C)   TempSrc: Oral   SpO2: 99%   Weight: 133 lb 9.6 oz (60.6 kg)   Height: 5' 4\" (1.626 m)   PainSc:   0 - No pain         HPI: Viri Benson is here for evaluation for cough that she had for several days about 1 week, she is having mainly cough, occasionally productive of sputum, she feels tired exhausted and body aches, cough keeps her up at night, and no fever no chills, no shortness of breath. Patient does not smoke, she is not asthmatic and has no chronic lung disease. She tried the herbal remedies as well as cough and cold medication over-the-counter    Past Medical History:   Diagnosis Date    Hypercholesterolemia      Past Surgical History:   Procedure Laterality Date    HX GYN      vaginal partial hysterectomy    HX PARTIAL THYROIDECTOMY      HX TONSILLECTOMY       Social History     Tobacco Use    Smoking status: Former Smoker     Years: 2.00    Smokeless tobacco: Never Used   Substance Use Topics    Alcohol use: Yes     Comment: social       Family History   Problem Relation Age of Onset    Hypertension Mother     Heart Disease Father        Review of Systems   Constitutional: Negative for chills, fever, malaise/fatigue and weight loss. HENT: Positive for congestion. Negative for ear discharge, ear pain, hearing loss, nosebleeds and sinus pain. Eyes: Negative for blurred vision, double vision and discharge. Respiratory: Positive for cough, sputum production and wheezing. Negative for stridor. Cardiovascular: Negative for chest pain, palpitations, claudication and leg swelling. Gastrointestinal: Negative for abdominal pain, constipation, diarrhea, nausea and vomiting. Genitourinary: Negative for dysuria, frequency and urgency. Musculoskeletal: Negative for myalgias. Skin: Negative for itching and rash. Neurological: Positive for headaches. Negative for dizziness, tingling, sensory change, speech change, focal weakness and weakness. Psychiatric/Behavioral: Negative for depression and suicidal ideas. Physical Exam  Vitals signs and nursing note reviewed. Constitutional:       General: She is not in acute distress. Appearance: She is well-developed. She is not diaphoretic. HENT:      Head: Normocephalic and atraumatic. Mouth/Throat:      Pharynx: No oropharyngeal exudate. Eyes:      General: No scleral icterus. Right eye: No discharge. Left eye: No discharge. Conjunctiva/sclera: Conjunctivae normal.      Pupils: Pupils are equal, round, and reactive to light. Neck:      Thyroid: No thyromegaly. Cardiovascular:      Rate and Rhythm: Normal rate and regular rhythm. Heart sounds: Normal heart sounds. No murmur. Pulmonary:      Effort: Pulmonary effort is normal. No respiratory distress. Breath sounds: Normal breath sounds. No wheezing or rales. Chest:      Chest wall: No tenderness. Abdominal:      General: There is no distension. Palpations: Abdomen is soft. Tenderness: There is no tenderness. There is no rebound. Musculoskeletal: Normal range of motion. General: No tenderness or deformity. Lymphadenopathy:      Cervical: No cervical adenopathy. Skin:     General: Skin is warm and dry. Coloration: Skin is not pale. Findings: No erythema or rash. Neurological:      Mental Status: She is alert and oriented to person, place, and time. Cranial Nerves: No cranial nerve deficit. Coordination: Coordination normal.   Psychiatric:         Behavior: Behavior normal.         Thought Content: Thought content normal.         Judgment: Judgment normal.          Assessment and plan     Plan of care has been discussed with the patient, he agrees to the plan and verbalized understanding.   All his questions were answered  More than 50% of the time spent in this visit was counseling the patient about  illness and treatment options         1. Acute bronchitis, unspecified organism  Patient advised to stay well-hydrated    If her symptom has not improved in 48 to 36hours to contact the office    Advised to take prednisone with food  - amoxicillin-clavulanate (AUGMENTIN) 875-125 mg per tablet; Take 1 Tab by mouth every twelve (12) hours. Dispense: 20 Tab; Refill: 0  - predniSONE (DELTASONE) 20 mg tablet; Take 2 tabs day 1 then 1 daily for 4 days  Dispense: 6 Tab; Refill: 0    Current Outpatient Medications   Medication Sig Dispense Refill    amoxicillin-clavulanate (AUGMENTIN) 875-125 mg per tablet Take 1 Tab by mouth every twelve (12) hours. 20 Tab 0    predniSONE (DELTASONE) 20 mg tablet Take 2 tabs day 1 then 1 daily for 4 days 6 Tab 0    vitamin a-vitamin c-vitamin e (ANTIOXIDANT) cap Take  by mouth.  multivitamin (ONE A DAY) tablet Take 1 Tab by mouth daily.  fish oil-omega-3 fatty acids 340-1,000 mg capsule Take 1 Cap by mouth daily.  calcium-cholecalciferol, d3, (CALCIUM 600 + D) 600-125 mg-unit tab Take  by mouth.  cholecalciferol (VITAMIN D3) 1,000 unit tablet Take  by mouth daily.  glucosamine (GLUCOSAMINE RELIEF) 1,000 mg tab Take  by mouth.  ferrous sulfate (IRON) 325 mg (65 mg iron) tablet Take  by mouth Daily (before breakfast).  fluocinolone acetonide oil (DERMOTIC OIL) 0.01 % drop Place 2 drops in each ear twice daily x 7 days then stop. Repeat as needed. 20 mL 1    clobetasol (TEMOVATE) 0.05 % topical cream Apply  to affected area two (2) times a day.  Use for 14 days, then stop and reassess 15 g 0       Patient Active Problem List    Diagnosis Date Noted    Hypercholesterolemia 07/03/2014     Results for orders placed or performed during the hospital encounter of 11/27/19   CULTURE, STOOL   Result Value Ref Range    Special Requests: NO SPECIAL REQUESTS      Culture result:        NO AEROMONAS,SALMONELLA,SHIGELLA,E. COLI 0:157 OR CAMPYLOBACTER ISOLATED   WBC, STOOL   Result Value Ref Range    White blood cells, stool FEW /HPF   OCCULT BLOOD, STOOL   Result Value Ref Range    Occult blood, stool NEGATIVE  NEG     OVA+PARASITE + GIARDIA   Result Value Ref Range    Source STOOL      Ova & Parasite exam Final Report Below     Giardia lamblia Ag, EIA NEGATIVE  NEGATIVE     C DIFFICILE TOXIN GENE, CHERYL   Result Value Ref Range    Source STOOL      C. difficile toxin gene, CHERYL NEGATIVE  NEGATIVE     O+P/GIARDIA REFLEX   Result Value Ref Range    Source STOOL      Result 1 Comment       Hospital Outpatient Visit on 2019   Component Date Value Ref Range Status    Special Requests: 2019 NO SPECIAL REQUESTS    Final    Culture result: 2019 NO AEROMONAS,SALMONELLA,SHIGELLA,E. COLI 0:157 OR CAMPYLOBACTER ISOLATED    Final    White blood cells, stool 2019 FEW  /HPF Final    WBC    Occult blood, stool 2019 NEGATIVE   NEG   Final    Source 2019 STOOL    Final    Ova & Parasite exam 2019 Final Report Below   Final    Comment: (NOTE)  These results were obtained using wet preparation(s) and trichrome  stained smear. This test does not include testing for  Cryptosporidium parvum, Cyclospora, or Microsporidia.   Performed At: [address], [place] [id number]  Mark AQUINO MD K      Giardia lamblia Ag, EIA 2019 NEGATIVE   NEGATIVE   Final    Comment: (NOTE)  Performed At: [address] [id number]  Gloria Hu MD [id number]      Source 2019 STOOL    Final    C. difficile toxin gene, CHERYL 2019 NEGATIVE   NEGATIVE   Final    Comment: (NOTE)  Performed At: [address] [id number]  Gloria Hu MD [id number]      Source 2019 STOOL    Final    Result 1 2019 Comment    Final    Comment: (NOTE)  No ova, cysts, or parasites seen. One negative specimen does not rule out the possibility of a  parasitic infection. Performed At: [address] [id number]   Brett Simmons 97 [id number]     Hospital Outpatient Visit on 11/19/2019   Component Date Value Ref Range Status    WBC 11/19/2019 5.0  4.6 - 13.2 K/uL Final    RBC 11/19/2019 4.94  4.20 - 5.30 M/uL Final    HGB 11/19/2019 15.3  12.0 - 16.0 g/dL Final    HCT 11/19/2019 47.3* 35.0 - 45.0 % Final    MCV 11/19/2019 95.7  74.0 - 97.0 FL Final    MCH 11/19/2019 31.0  24.0 - 34.0 PG Final    MCHC 11/19/2019 32.3  31.0 - 37.0 g/dL Final    RDW 11/19/2019 13.6  11.6 - 14.5 % Final    PLATELET 11/31/8278 788  135 - 420 K/uL Final    MPV 11/19/2019 9.2  9.2 - 11.8 FL Final    NEUTROPHILS 11/19/2019 58  40 - 73 % Final    LYMPHOCYTES 11/19/2019 28  21 - 52 % Final    MONOCYTES 11/19/2019 9  3 - 10 % Final    EOSINOPHILS 11/19/2019 4  0 - 5 % Final    BASOPHILS 11/19/2019 1  0 - 2 % Final    ABS. NEUTROPHILS 11/19/2019 2.9  1.8 - 8.0 K/UL Final    ABS. LYMPHOCYTES 11/19/2019 1.4  0.9 - 3.6 K/UL Final    ABS. MONOCYTES 11/19/2019 0.4  0.05 - 1.2 K/UL Final    ABS. EOSINOPHILS 11/19/2019 0.2  0.0 - 0.4 K/UL Final    ABS. BASOPHILS 11/19/2019 0.1  0.0 - 0.1 K/UL Final    DF 11/19/2019 AUTOMATED    Final    Protein, total 11/19/2019 7.2  6.4 - 8.2 g/dL Final    Albumin 11/19/2019 4.1  3.4 - 5.0 g/dL Final    Globulin 11/19/2019 3.1  2.0 - 4.0 g/dL Final    A-G Ratio 11/19/2019 1.3  0.8 - 1.7   Final    Bilirubin, total 11/19/2019 0.5  0.2 - 1.0 MG/DL Final    Bilirubin, direct 11/19/2019 0.2  0.0 - 0.2 MG/DL Final    Alk.  phosphatase 11/19/2019 67  45 - 117 U/L Final    AST (SGOT) 11/19/2019 8* 10 - 38 U/L Final    ALT (SGPT) 11/19/2019 31  13 - 56 U/L Final    LIPID PROFILE 11/19/2019        Final    Cholesterol, total 11/19/2019 330* <200 MG/DL Final    Triglyceride 11/19/2019 62  <150 MG/DL Final Comment: The drugs N-acetylcysteine (NAC) and  Metamiszole have been found to cause falsely  low results in this chemical assay. Please  be sure to submit blood samples obtained  BEFORE administration of either of these  drugs to assure correct results.  HDL Cholesterol 11/19/2019 87* 40 - 60 MG/DL Final    LDL, calculated 11/19/2019 230.6* 0 - 100 MG/DL Final    VLDL, calculated 11/19/2019 12.4  MG/DL Final    CHOL/HDL Ratio 11/19/2019 3.8  0 - 5.0   Final    Sodium 11/19/2019 140  136 - 145 mmol/L Final    Potassium 11/19/2019 4.3  3.5 - 5.5 mmol/L Final    Chloride 11/19/2019 106  100 - 111 mmol/L Final    CO2 11/19/2019 30  21 - 32 mmol/L Final    Anion gap 11/19/2019 4  3.0 - 18 mmol/L Final    Glucose 11/19/2019 92  74 - 99 mg/dL Final    BUN 11/19/2019 18  7.0 - 18 MG/DL Final    Creatinine 11/19/2019 0.88  0.6 - 1.3 MG/DL Final    BUN/Creatinine ratio 11/19/2019 20  12 - 20   Final    GFR est AA 11/19/2019 >60  >60 ml/min/1.73m2 Final    GFR est non-AA 11/19/2019 >60  >60 ml/min/1.73m2 Final    Comment: (NOTE)  Estimated GFR is calculated using the Modification of Diet in Renal   Disease (MDRD) Study equation, reported for both  Americans   (GFRAA) and non- Americans (GFRNA), and normalized to 1.73m2   body surface area. The physician must decide which value applies to   the patient. The MDRD study equation should only be used in   individuals age 25 or older. It has not been validated for the   following: pregnant women, patients with serious comorbid conditions,   or on certain medications, or persons with extremes of body size,   muscle mass, or nutritional status.       Calcium 11/19/2019 9.5  8.5 - 10.1 MG/DL Final    TSH 11/19/2019 2.85  0.36 - 3.74 uIU/mL Final    T4, Free 11/19/2019 0.8  0.7 - 1.5 NG/DL Final    Color 11/19/2019 YELLOW    Final    Appearance 11/19/2019 CLEAR    Final    Specific gravity 11/19/2019 1.025  1.005 - 1.030   Final    pH (UA) 11/19/2019 5.0  5.0 - 8.0   Final    Protein 11/19/2019 NEGATIVE   NEG mg/dL Final    Glucose 11/19/2019 NEGATIVE   NEG mg/dL Final    Ketone 11/19/2019 NEGATIVE   NEG mg/dL Final    Bilirubin 11/19/2019 NEGATIVE   NEG   Final    Blood 11/19/2019 NEGATIVE   NEG   Final    Urobilinogen 11/19/2019 0.2  0.2 - 1.0 EU/dL Final    Nitrites 11/19/2019 NEGATIVE   NEG   Final    Leukocyte Esterase 11/19/2019 NEGATIVE   NEG   Final          Follow-up and Dispositions    · Return if symptoms worsen or fail to improve, for follow up with PCP .

## 2020-09-01 ENCOUNTER — TELEPHONE (OUTPATIENT)
Dept: FAMILY MEDICINE CLINIC | Age: 75
End: 2020-09-01

## 2020-09-01 NOTE — TELEPHONE ENCOUNTER
Pt is schedule for [facility] in the beginning of Dec. Please put in lab order for one week prior.   Future Appointments   Date Time Provider Marcia Hurtado   11/24/2020  9:40 AM [facility] OLSON   12/1/2020  9:30 AM Tarsha Zeng MD [facility]

## 2020-09-02 DIAGNOSIS — E78.00 HYPERCHOLESTEROLEMIA: Primary | ICD-10-CM

## 2020-09-02 DIAGNOSIS — M85.9 DISORDER OF BONE DENSITY AND STRUCTURE, UNSPECIFIED: ICD-10-CM

## 2020-11-24 ENCOUNTER — HOSPITAL ENCOUNTER (OUTPATIENT)
Dept: LAB | Age: 75
Discharge: HOME | End: 2020-11-24
Payer: MEDICARE

## 2020-11-24 ENCOUNTER — APPOINTMENT (OUTPATIENT)
Dept: FAMILY MEDICINE CLINIC | Age: 75
End: 2020-11-24

## 2020-11-24 DIAGNOSIS — M85.9 DISORDER OF BONE DENSITY AND STRUCTURE, UNSPECIFIED: ICD-10-CM

## 2020-11-24 DIAGNOSIS — E78.00 HYPERCHOLESTEROLEMIA: ICD-10-CM

## 2020-11-24 LAB
25(OH)D3 SERPL-MCNC: 60.4 NG/ML (ref 30–100)
ALBUMIN SERPL-MCNC: 4.1 G/DL (ref 3.4–5)
ALBUMIN/GLOB SERPL: 1.5 {RATIO} (ref 0.8–1.7)
ALP SERPL-CCNC: 72 U/L (ref 45–117)
ALT SERPL-CCNC: 22 U/L (ref 13–56)
ANION GAP SERPL CALC-SCNC: 2 MMOL/L (ref 3–18)
AST SERPL-CCNC: 9 U/L (ref 10–38)
BASOPHILS # BLD: 0 K/UL (ref 0–0.1)
BASOPHILS NFR BLD: 0 % (ref 0–2)
BILIRUB DIRECT SERPL-MCNC: 0.1 MG/DL (ref 0–0.2)
BILIRUB SERPL-MCNC: 0.6 MG/DL (ref 0.2–1)
BUN SERPL-MCNC: 11 MG/DL (ref 7–18)
BUN/CREAT SERPL: 13 (ref 12–20)
CALCIUM SERPL-MCNC: 9.3 MG/DL (ref 8.5–10.1)
CHLORIDE SERPL-SCNC: 101 MMOL/L (ref 100–111)
CHOLEST SERPL-MCNC: 272 MG/DL
CO2 SERPL-SCNC: 31 MMOL/L (ref 21–32)
CREAT SERPL-MCNC: 0.83 MG/DL (ref 0.6–1.3)
DIFFERENTIAL METHOD BLD: ABNORMAL
EOSINOPHIL # BLD: 0.2 K/UL (ref 0–0.4)
EOSINOPHIL NFR BLD: 3 % (ref 0–5)
ERYTHROCYTE [DISTWIDTH] IN BLOOD BY AUTOMATED COUNT: 13 % (ref 11.6–14.5)
GLOBULIN SER CALC-MCNC: 2.7 G/DL (ref 2–4)
GLUCOSE SERPL-MCNC: 88 MG/DL (ref 74–99)
HCT VFR BLD AUTO: 45.4 % (ref 35–45)
HDLC SERPL-MCNC: 78 MG/DL (ref 40–60)
HDLC SERPL: 3.5 {RATIO} (ref 0–5)
HGB BLD-MCNC: 15 G/DL (ref 12–16)
LDLC SERPL CALC-MCNC: 183.4 MG/DL (ref 0–100)
LIPID PROFILE,FLP: ABNORMAL
LYMPHOCYTES # BLD: 1.5 K/UL (ref 0.9–3.6)
LYMPHOCYTES NFR BLD: 28 % (ref 21–52)
MCH RBC QN AUTO: 31.1 PG (ref 24–34)
MCHC RBC AUTO-ENTMCNC: 33 G/DL (ref 31–37)
MCV RBC AUTO: 94 FL (ref 74–97)
MONOCYTES # BLD: 0.5 K/UL (ref 0.05–1.2)
MONOCYTES NFR BLD: 8 % (ref 3–10)
NEUTS SEG # BLD: 3.4 K/UL (ref 1.8–8)
NEUTS SEG NFR BLD: 61 % (ref 40–73)
PLATELET # BLD AUTO: 263 K/UL (ref 135–420)
PMV BLD AUTO: 9.3 FL (ref 9.2–11.8)
POTASSIUM SERPL-SCNC: 4.4 MMOL/L (ref 3.5–5.5)
PROT SERPL-MCNC: 6.8 G/DL (ref 6.4–8.2)
RBC # BLD AUTO: 4.83 M/UL (ref 4.2–5.3)
SODIUM SERPL-SCNC: 134 MMOL/L (ref 136–145)
T4 FREE SERPL-MCNC: 0.9 NG/DL (ref 0.7–1.5)
TRIGL SERPL-MCNC: 53 MG/DL (ref ?–150)
TSH SERPL DL<=0.05 MIU/L-ACNC: 2.21 UIU/ML (ref 0.36–3.74)
VLDLC SERPL CALC-MCNC: 10.6 MG/DL
WBC # BLD AUTO: 5.6 K/UL (ref 4.6–13.2)

## 2020-11-24 PROCEDURE — 80061 LIPID PANEL: CPT

## 2020-11-24 PROCEDURE — 82306 VITAMIN D 25 HYDROXY: CPT

## 2020-11-24 PROCEDURE — 85025 COMPLETE CBC W/AUTO DIFF WBC: CPT

## 2020-11-24 PROCEDURE — 84443 ASSAY THYROID STIM HORMONE: CPT

## 2020-11-24 PROCEDURE — 36415 COLL VENOUS BLD VENIPUNCTURE: CPT

## 2020-11-24 PROCEDURE — 84439 ASSAY OF FREE THYROXINE: CPT

## 2020-11-24 PROCEDURE — 80048 BASIC METABOLIC PNL TOTAL CA: CPT

## 2020-11-24 PROCEDURE — 80076 HEPATIC FUNCTION PANEL: CPT

## 2020-12-01 ENCOUNTER — OFFICE VISIT (OUTPATIENT)
Dept: FAMILY MEDICINE CLINIC | Age: 75
End: 2020-12-01
Payer: MEDICARE

## 2020-12-01 VITALS
DIASTOLIC BLOOD PRESSURE: 80 MMHG | WEIGHT: 137 LBS | HEART RATE: 58 BPM | OXYGEN SATURATION: 100 % | BODY MASS INDEX: 23.39 KG/M2 | TEMPERATURE: 96.7 F | HEIGHT: 64 IN | SYSTOLIC BLOOD PRESSURE: 150 MMHG | RESPIRATION RATE: 16 BRPM

## 2020-12-01 DIAGNOSIS — M85.80 OSTEOPENIA, UNSPECIFIED LOCATION: ICD-10-CM

## 2020-12-01 DIAGNOSIS — E78.00 HYPERCHOLESTEROLEMIA: Primary | ICD-10-CM

## 2020-12-01 DIAGNOSIS — Z00.00 MEDICARE ANNUAL WELLNESS VISIT, SUBSEQUENT: ICD-10-CM

## 2020-12-01 DIAGNOSIS — Z78.0 POSTMENOPAUSAL: ICD-10-CM

## 2020-12-01 PROCEDURE — G8420 CALC BMI NORM PARAMETERS: HCPCS | Performed by: INTERNAL MEDICINE

## 2020-12-01 PROCEDURE — G8427 DOCREV CUR MEDS BY ELIG CLIN: HCPCS | Performed by: INTERNAL MEDICINE

## 2020-12-01 PROCEDURE — 3017F COLORECTAL CA SCREEN DOC REV: CPT | Performed by: INTERNAL MEDICINE

## 2020-12-01 PROCEDURE — G8536 NO DOC ELDER MAL SCRN: HCPCS | Performed by: INTERNAL MEDICINE

## 2020-12-01 PROCEDURE — 99213 OFFICE O/P EST LOW 20 MIN: CPT | Performed by: INTERNAL MEDICINE

## 2020-12-01 PROCEDURE — G0439 PPPS, SUBSEQ VISIT: HCPCS | Performed by: INTERNAL MEDICINE

## 2020-12-01 PROCEDURE — 1090F PRES/ABSN URINE INCON ASSESS: CPT | Performed by: INTERNAL MEDICINE

## 2020-12-01 PROCEDURE — 1101F PT FALLS ASSESS-DOCD LE1/YR: CPT | Performed by: INTERNAL MEDICINE

## 2020-12-01 PROCEDURE — G0463 HOSPITAL OUTPT CLINIC VISIT: HCPCS | Performed by: INTERNAL MEDICINE

## 2020-12-01 PROCEDURE — G8399 PT W/DXA RESULTS DOCUMENT: HCPCS | Performed by: INTERNAL MEDICINE

## 2020-12-01 PROCEDURE — G8510 SCR DEP NEG, NO PLAN REQD: HCPCS | Performed by: INTERNAL MEDICINE

## 2020-12-01 RX ORDER — ATORVASTATIN CALCIUM 10 MG/1
10 TABLET, FILM COATED ORAL DAILY
Qty: 90 TAB | Refills: 1 | Status: SHIPPED | OUTPATIENT
Start: 2020-12-01

## 2020-12-01 NOTE — PROGRESS NOTES
Assessment/Plan:    *Diagnoses and all orders for this visit:    1. Hypercholesterolemia  -     atorvastatin (LIPITOR) 10 mg tablet; Take 1 Tab by mouth daily. 2. Postmenopausal  -     DEXA BONE DENSITY STUDY AXIAL; Future    3. Medicare annual wellness visit, subsequent    4. Osteopenia, unspecified location        Will f/u in 2 months. Will do labs prior, lipid, LFTs. The plan was discussed with the patient. The patient verbalized understanding and is in agreement with the plan. All medication potential side effects were discussed with the patient.    -------------------------------------------------------------------------------------------------------------------        Chaz Ramires is a 76 y.o. female and presents with Annual Wellness Visit         Subjective:  Pt here for f/u. HLD: has always been very high but she now has decided to agree to treatment. Osteopenia:  Due for repeat DEXA. Review of Systems - General ROS: negative         The problem list was updated as a part of today's visit. Patient Active Problem List   Diagnosis Code    Hypercholesterolemia E78.00    Osteopenia M85.80       The PS,  were reviewed. SH:  Social History     Tobacco Use    Smoking status: Former Smoker     Years: 2.00    Smokeless tobacco: Never Used   Substance Use Topics    Alcohol use: Yes     Comment: social    Drug use: No       Medications/Allergies:  Current Outpatient Medications on File Prior to Visit   Medication Sig Dispense Refill    vitamin a-vitamin c-vitamin e (ANTIOXIDANT) cap Take  by mouth.  multivitamin (ONE A DAY) tablet Take 1 Tab by mouth daily.  fish oil-omega-3 fatty acids 340-1,000 mg capsule Take 1 Cap by mouth daily.  calcium-cholecalciferol, d3, (CALCIUM 600 + D) 600-125 mg-unit tab Take  by mouth.  cholecalciferol (VITAMIN D3) 1,000 unit tablet Take  by mouth daily.  glucosamine (GLUCOSAMINE RELIEF) 1,000 mg tab Take  by mouth.       ferrous sulfate (IRON) 325 mg (65 mg iron) tablet Take  by mouth Daily (before breakfast).  [DISCONTINUED] amoxicillin-clavulanate (AUGMENTIN) 875-125 mg per tablet Take 1 Tab by mouth every twelve (12) hours. 20 Tab 0    [DISCONTINUED] predniSONE (DELTASONE) 20 mg tablet Take 2 tabs day 1 then 1 daily for 4 days 6 Tab 0    [DISCONTINUED] fluocinolone acetonide oil (DERMOTIC OIL) 0.01 % drop Place 2 drops in each ear twice daily x 7 days then stop. Repeat as needed. 20 mL 1    [DISCONTINUED] clobetasol (TEMOVATE) 0.05 % topical cream Apply  to affected area two (2) times a day. Use for 14 days, then stop and reassess 15 g 0     No current facility-administered medications on file prior to visit. No Known Allergies      Health Maintenance:   Health Maintenance   Topic Date Due    GLAUCOMA SCREENING Q2Y  10/09/2020    Medicare Yearly Exam  11/26/2020    Shingrix Vaccine Age 50> (2 of 2) 12/31/2020    DTaP/Tdap/Td series (2 - Td) 04/14/2021    Lipid Screen  11/24/2025    Colorectal Cancer Screening Combo  10/16/2028    Hepatitis C Screening  Completed    Bone Densitometry (Dexa) Screening  Completed    Flu Vaccine  Completed    Pneumococcal 65+ years  Completed       Objective:  Visit Vitals  BP (!) 150/80   Pulse (!) 58   Temp (!) 96.7 °F (35.9 °C) (Temporal)   Resp 16   Ht 5' 4\" (1.626 m)   Wt 137 lb (62.1 kg)   SpO2 100%   BMI 23.52 kg/m²          Nurses notes and VS reviewed.       Physical Examination: General appearance - alert, well appearing, and in no distress  Chest - clear to auscultation, no wheezes, rales or rhonchi, symmetric air entry  Heart - normal rate, regular rhythm, normal S1, S2, no murmurs, rubs, clicks or gallops          Labwork and Ancillary Studies:    CBC w/Diff  Lab Results   Component Value Date/Time    WBC 5.6 11/24/2020 09:30 AM    HGB 15.0 11/24/2020 09:30 AM    PLATELET 246 92/17/6357 09:30 AM         Basic Metabolic Profile  Lab Results   Component Value Date/Time    Sodium 134 (L) 11/24/2020 09:30 AM    Potassium 4.4 11/24/2020 09:30 AM    Chloride 101 11/24/2020 09:30 AM    CO2 31 11/24/2020 09:30 AM    Anion gap 2 (L) 11/24/2020 09:30 AM    Glucose 88 11/24/2020 09:30 AM    BUN 11 11/24/2020 09:30 AM    Creatinine 0.83 11/24/2020 09:30 AM    BUN/Creatinine ratio 13 11/24/2020 09:30 AM    GFR est AA >60 11/24/2020 09:30 AM    GFR est non-AA >60 11/24/2020 09:30 AM    Calcium 9.3 11/24/2020 09:30 AM         LFT  Lab Results   Component Value Date/Time    ALT (SGPT) 22 11/24/2020 09:30 AM    Alk.  phosphatase 72 11/24/2020 09:30 AM    Bilirubin, direct 0.1 11/24/2020 09:30 AM    Bilirubin, total 0.6 11/24/2020 09:30 AM         Cholesterol  Lab Results   Component Value Date/Time    Cholesterol, total 272 (H) 11/24/2020 09:30 AM    HDL Cholesterol 78 (H) 11/24/2020 09:30 AM    LDL, calculated 183.4 (H) 11/24/2020 09:30 AM    Triglyceride 53 11/24/2020 09:30 AM    CHOL/HDL Ratio 3.5 11/24/2020 09:30 AM

## 2020-12-01 NOTE — PROGRESS NOTES
Arminda Duggan is a 76 y.o. female (: 1945) presenting to address:    Chief Complaint   Patient presents with    Annual Wellness Visit       Vitals:    20 0937   BP: (!) 155/84   Pulse: (!) 58   Resp: 16   Temp: (!) 96.7 °F (35.9 °C)   TempSrc: Temporal   SpO2: 100%   Weight: 137 lb (62.1 kg)   Height: 5' 4\" (1.626 m)   PainSc:   0 - No pain       Hearing/Vision:      Hearing Screening    125Hz 250Hz 500Hz 1000Hz 2000Hz 3000Hz 4000Hz 6000Hz 8000Hz   Right ear:            Left ear:               Visual Acuity Screening    Right eye Left eye Both eyes   Without correction: 20/20 20/70 20/20   With correction:      Comments: Patient has mono vision       Learning Assessment:     Learning Assessment 2020   PRIMARY LEARNER Patient   HIGHEST LEVEL OF EDUCATION - PRIMARY LEARNER  4 YEARS OF COLLEGE   BARRIERS PRIMARY LEARNER -   CO-LEARNER CAREGIVER -   PRIMARY LANGUAGE ENGLISH   LEARNER PREFERENCE PRIMARY DEMONSTRATION     READING     VIDEOS     LISTENING     PICTURES   ANSWERED BY patient   RELATIONSHIP SELF     Depression Screening:     3 most recent PHQ Screens 2020   Little interest or pleasure in doing things Not at all   Feeling down, depressed, irritable, or hopeless Not at all   Total Score PHQ 2 0     Fall Risk Assessment:     Fall Risk Assessment, last 12 mths 2020   Able to walk? Yes   Fall in past 12 months? No   Fall with injury? -   Number of falls in past 12 months -   Fall Risk Score -     Abuse Screening:     Abuse Screening Questionnaire 2020   Do you ever feel afraid of your partner? N   Are you in a relationship with someone who physically or mentally threatens you? N   Is it safe for you to go home? Y     Coordination of Care Questionaire:   1. Have you been to the ER, urgent care clinic since your last visit? Hospitalized since your last visit? NO    2.  Have you seen or consulted any other health care providers outside of the [address] [place] [address] since your last visit? Include any pap smears or colon screening. Yes     Advanced Directive:   1. Do you have an Advanced Directive? NO    2. Would you like information on Advanced Directives?  NO

## 2020-12-01 NOTE — PATIENT INSTRUCTIONS
Medicare Wellness Visit, Female The best way to live healthy is to have a lifestyle where you eat a well-balanced diet, exercise regularly, limit alcohol use, and quit all forms of tobacco/nicotine, if applicable. Regular preventive services are another way to keep healthy. Preventive services (vaccines, screening tests, monitoring & exams) can help personalize your care plan, which helps you manage your own care. Screening tests can find health problems at the earliest stages, when they are easiest to treat. [facility] follows the current, evidence-based guidelines published by the [facility] Azeem Buck ([facility]) when recommending preventive services for our patients. Because we follow these guidelines, sometimes recommendations change over time as research supports it. (For example, mammograms used to be recommended annually. Even though Medicare will still pay for an annual mammogram, the newer guidelines recommend a mammogram every two years for women of average risk). Of course, you and your doctor may decide to screen more often for some diseases, based on your risk and your co-morbidities (chronic disease you are already diagnosed with). Preventive services for you include: - Medicare offers their members a free annual wellness visit, which is time for you and your primary care provider to discuss and plan for your preventive service needs. Take advantage of this benefit every year! 
-All adults over the age of 72 should receive the recommended pneumonia vaccines. Current USPSTF guidelines recommend a series of two vaccines for the best pneumonia protection.  
-All adults should have a flu vaccine yearly and a tetanus vaccine every 10 years.  
-All adults age 48 and older should receive the shingles vaccines (series of two vaccines). -All adults age 38-68 who are overweight should have a diabetes screening test once every three years. -All adults born between [birth year] and 1965 should be screened once for Hepatitis C. 
-Other screening tests and preventive services for persons with diabetes include: an eye exam to screen for diabetic retinopathy, a kidney function test, a foot exam, and stricter control over your cholesterol.  
-Cardiovascular screening for adults with routine risk involves an electrocardiogram (ECG) at intervals determined by your doctor.  
-Colorectal cancer screenings should be done for adults age 54-65 with no increased risk factors for colorectal cancer. There are a number of acceptable methods of screening for this type of cancer. Each test has its own benefits and drawbacks. Discuss with your doctor what is most appropriate for you during your annual wellness visit. The different tests include: colonoscopy (considered the best screening method), a fecal occult blood test, a fecal DNA test, and sigmoidoscopy. 
 
-A bone mass density test is recommended when a woman turns 65 to screen for osteoporosis. This test is only recommended one time, as a screening. Some providers will use this same test as a disease monitoring tool if you already have osteoporosis. -Breast cancer screenings are recommended every other year for women of normal risk, age 54-69. 
-Cervical cancer screenings for women over age 72 are only recommended with certain risk factors. Here is a list of your current Health Maintenance items (your personalized list of preventive services) with a due date: 
Health Maintenance Due Topic Date Due  Glaucoma Screening   10/09/2020 Dede Keller Annual Well Visit  11/26/2020 Medicare Wellness Visit, Female The best way to live healthy is to have a lifestyle where you eat a well-balanced diet, exercise regularly, limit alcohol use, and quit all forms of tobacco/nicotine, if applicable. Regular preventive services are another way to keep healthy.  Preventive services (vaccines, screening tests, monitoring & exams) can help personalize your care plan, which helps you manage your own care. Screening tests can find health problems at the earliest stages, when they are easiest to treat. [facility] follows the current, evidence-based guidelines published by the [facility] Azeem Buck ([facility]) when recommending preventive services for our patients. Because we follow these guidelines, sometimes recommendations change over time as research supports it. (For example, mammograms used to be recommended annually. Even though Medicare will still pay for an annual mammogram, the newer guidelines recommend a mammogram every two years for women of average risk). Of course, you and your doctor may decide to screen more often for some diseases, based on your risk and your co-morbidities (chronic disease you are already diagnosed with). Preventive services for you include: - Medicare offers their members a free annual wellness visit, which is time for you and your primary care provider to discuss and plan for your preventive service needs. Take advantage of this benefit every year! 
-All adults over the age of 72 should receive the recommended pneumonia vaccines. Current USPSTF guidelines recommend a series of two vaccines for the best pneumonia protection.  
-All adults should have a flu vaccine yearly and a tetanus vaccine every 10 years.  
-All adults age 48 and older should receive the shingles vaccines (series of two vaccines). -All adults age 38-68 who are overweight should have a diabetes screening test once every three years.  
-All adults born between 80 and 1965 should be screened once for Hepatitis C. 
-Other screening tests and preventive services for persons with diabetes include: an eye exam to screen for diabetic retinopathy, a kidney function test, a foot exam, and stricter control over your cholesterol. -Cardiovascular screening for adults with routine risk involves an electrocardiogram (ECG) at intervals determined by your doctor.  
-Colorectal cancer screenings should be done for adults age 54-65 with no increased risk factors for colorectal cancer. There are a number of acceptable methods of screening for this type of cancer. Each test has its own benefits and drawbacks. Discuss with your doctor what is most appropriate for you during your annual wellness visit. The different tests include: colonoscopy (considered the best screening method), a fecal occult blood test, a fecal DNA test, and sigmoidoscopy. 
 
-A bone mass density test is recommended when a woman turns 65 to screen for osteoporosis. This test is only recommended one time, as a screening. Some providers will use this same test as a disease monitoring tool if you already have osteoporosis. -Breast cancer screenings are recommended every other year for women of normal risk, age 54-69. 
-Cervical cancer screenings for women over age 72 are only recommended with certain risk factors. Here is a list of your current Health Maintenance items (your personalized list of preventive services) with a due date: 
Health Maintenance Due Topic Date Due  Glaucoma Screening   10/09/2020 [address] [place] [address] Annual Well Visit  11/26/2020

## 2020-12-01 NOTE — PROGRESS NOTES
This is the Subsequent Medicare Annual Wellness Exam, performed 12 months or more after the Initial AWV or the last Subsequent AWV    I have reviewed the patient's medical history in detail and updated the computerized patient record. Depression Risk Factor Screening:     3 most recent PHQ Screens 12/1/2020   Little interest or pleasure in doing things Not at all   Feeling down, depressed, irritable, or hopeless Not at all   Total Score PHQ 2 0       Alcohol Risk Screen   Do you average more than 1 drink per night or more than 7 drinks a week:  No    On any one occasion in the past three months have you have had more than 3 drinks containing alcohol:  No        Functional Ability and Level of Safety:   Hearing: Hearing is good. Activities of Daily Living: The home contains: no safety equipment. Patient does total self care     Ambulation: with no difficulty     Fall Risk:  Fall Risk Assessment, last 12 mths 12/1/2020   Able to walk? Yes   Fall in past 12 months? No   Fall with injury? -   Number of falls in past 12 months -   Fall Risk Score -     Abuse Screen:  Patient is not abused       Cognitive Screening   Has your family/caregiver stated any concerns about your memory: no         Assessment/Plan   Education and counseling provided:  Are appropriate based on today's review and evaluation    Diagnoses and all orders for this visit:    1. Medicare annual wellness visit, subsequent    2. Postmenopausal  -     DEXA BONE DENSITY STUDY AXIAL; Future    3. Hypercholesterolemia  -     atorvastatin (LIPITOR) 10 mg tablet; Take 1 Tab by mouth daily.         Health Maintenance Due     Health Maintenance Due   Topic Date Due    GLAUCOMA SCREENING Q2Y  10/09/2020    Medicare Yearly Exam  11/26/2020       Patient Care Team   Patient Care Team:  Anayeli Ley MD as PCP - General (Internal Medicine)  Anayeli Ley MD as PCP - [facility] Empaneled Provider  Katia Fregoso MD (Dermatology)  Jarod Tiwari MD (Optometry)    History     Patient Active Problem List   Diagnosis Code    Hypercholesterolemia E78.00     Past Medical History:   Diagnosis Date    Hypercholesterolemia       Past Surgical History:   Procedure Laterality Date    HX GYN      vaginal partial hysterectomy    HX PARTIAL THYROIDECTOMY      HX TONSILLECTOMY       Current Outpatient Medications   Medication Sig Dispense Refill    atorvastatin (LIPITOR) 10 mg tablet Take 1 Tab by mouth daily. 90 Tab 1    vitamin a-vitamin c-vitamin e (ANTIOXIDANT) cap Take  by mouth.  multivitamin (ONE A DAY) tablet Take 1 Tab by mouth daily.  fish oil-omega-3 fatty acids 340-1,000 mg capsule Take 1 Cap by mouth daily.  calcium-cholecalciferol, d3, (CALCIUM 600 + D) 600-125 mg-unit tab Take  by mouth.  cholecalciferol (VITAMIN D3) 1,000 unit tablet Take  by mouth daily.  glucosamine (GLUCOSAMINE RELIEF) 1,000 mg tab Take  by mouth.  ferrous sulfate (IRON) 325 mg (65 mg iron) tablet Take  by mouth Daily (before breakfast).        No Known Allergies    Family History   Problem Relation Age of Onset    Hypertension Mother     Heart Disease Father      Social History     Tobacco Use    Smoking status: Former Smoker     Years: 2.00    Smokeless tobacco: Never Used   Substance Use Topics    Alcohol use: Yes     Comment: social

## 2022-03-18 PROBLEM — M85.80 OSTEOPENIA: Status: ACTIVE | Noted: 2020-12-01

## 2022-09-15 ENCOUNTER — ESTABLISHED PATIENT (OUTPATIENT)
Age: 77
End: 2022-09-15

## 2022-09-15 DIAGNOSIS — H04.123: ICD-10-CM

## 2022-09-15 PROCEDURE — 68761 CLOSE TEAR DUCT OPENING: CPT

## 2022-09-15 PROCEDURE — A4263 PERMANENT TEAR DUCT PLUG: HCPCS

## 2022-09-15 PROCEDURE — 83861 MICROFLUID ANALY TEARS: CPT

## 2022-09-15 ASSESSMENT — VISUAL ACUITY
OU_SC: 20/40
OU_CC: 20/20
OS_CC: 20/25
OS_SC: 20/40
OD_CC: 20/25

## 2022-09-15 ASSESSMENT — TONOMETRY
OS_IOP_MMHG: 12
OD_IOP_MMHG: 12

## 2022-11-01 ENCOUNTER — COMPREHENSIVE EXAM (OUTPATIENT)
Facility: LOCATION | Age: 77
End: 2022-11-01

## 2022-11-01 DIAGNOSIS — H40.013: ICD-10-CM

## 2022-11-01 DIAGNOSIS — Z01.00: ICD-10-CM

## 2022-11-01 PROCEDURE — 99214 OFFICE O/P EST MOD 30 MIN: CPT

## 2022-11-01 PROCEDURE — 92499OP2 OPTOMAP RETINAL SCREENING BOTH EYES

## 2022-11-01 PROCEDURE — 92015 DETERMINE REFRACTIVE STATE: CPT

## 2022-11-01 ASSESSMENT — VISUAL ACUITY
OD_SC: 20/40
OS_CC: 20/25
OU_SC: 20/30
OU_SC: 20/40
OD_CC: 20/20
OU_CC: 20/20
OS_SC: 20/40
OD_SC: 20/70
OS_SC: 20/25

## 2022-11-01 ASSESSMENT — KERATOMETRY
OD_K2POWER_DIOPTERS: 44.50
OS_AXISANGLE2_DEGREES: 50
OS_K1POWER_DIOPTERS: 44.25
OD_K1POWER_DIOPTERS: 42.75
OS_K2POWER_DIOPTERS: 44.75
OS_AXISANGLE_DEGREES: 140
OD_AXISANGLE_DEGREES: 108
OD_AXISANGLE2_DEGREES: 18

## 2022-11-01 ASSESSMENT — TONOMETRY
OS_IOP_MMHG: 13
OD_IOP_MMHG: 13

## 2022-12-20 ENCOUNTER — FOLLOW UP (OUTPATIENT)
Facility: LOCATION | Age: 77
End: 2022-12-20

## 2022-12-20 PROCEDURE — 83861 MICROFLUID ANALY TEARS: CPT

## 2022-12-20 PROCEDURE — 68761 CLOSE TEAR DUCT OPENING: CPT

## 2022-12-20 PROCEDURE — 92285 EXTERNAL OCULAR PHOTOGRAPHY: CPT

## 2022-12-20 ASSESSMENT — VISUAL ACUITY
OD_SC: 20/70
OU_SC: 20/40
OS_SC: 20/40
OU_CC: 20/20
OS_CC: 20/25
OD_CC: 20/25

## 2022-12-20 ASSESSMENT — KERATOMETRY
OD_K2POWER_DIOPTERS: 44.50
OD_AXISANGLE2_DEGREES: 18
OS_K1POWER_DIOPTERS: 44.25
OD_AXISANGLE_DEGREES: 108
OS_K2POWER_DIOPTERS: 44.75
OS_AXISANGLE_DEGREES: 140
OS_AXISANGLE2_DEGREES: 50
OD_K1POWER_DIOPTERS: 42.75

## 2023-04-03 ENCOUNTER — ESTABLISHED PATIENT (OUTPATIENT)
Facility: LOCATION | Age: 78
End: 2023-04-03

## 2023-04-03 DIAGNOSIS — H40.013: ICD-10-CM

## 2023-04-03 PROCEDURE — 99213 OFFICE O/P EST LOW 20 MIN: CPT

## 2023-04-03 PROCEDURE — 76514 ECHO EXAM OF EYE THICKNESS: CPT

## 2023-04-03 PROCEDURE — 92020 GONIOSCOPY: CPT

## 2023-04-03 PROCEDURE — 92083 EXTENDED VISUAL FIELD XM: CPT

## 2023-04-03 PROCEDURE — 92133 CPTRZD OPH DX IMG PST SGM ON: CPT

## 2023-04-03 ASSESSMENT — TONOMETRY
OD_IOP_MMHG: 14
OS_IOP_MMHG: 14

## 2023-04-03 ASSESSMENT — VISUAL ACUITY
OS_SC: 20/30
OU_SC: 20/30
OD_SC: 20/60

## 2023-04-04 ENCOUNTER — ESTABLISHED PATIENT (OUTPATIENT)
Facility: LOCATION | Age: 78
End: 2023-04-04

## 2023-04-04 DIAGNOSIS — H04.123: ICD-10-CM

## 2023-04-04 PROCEDURE — 83861 MICROFLUID ANALY TEARS: CPT

## 2023-04-04 PROCEDURE — 92285 EXTERNAL OCULAR PHOTOGRAPHY: CPT

## 2023-04-04 PROCEDURE — 68761 CLOSE TEAR DUCT OPENING: CPT

## 2023-04-04 ASSESSMENT — VISUAL ACUITY
OS_CC: 20/20
OS_SC: 20/30
OU_CC: 20/20
OD_CC: 20/20
OU_SC: 20/30
OD_SC: 20/70

## 2023-05-19 RX ORDER — ATORVASTATIN CALCIUM 10 MG/1
10 TABLET, FILM COATED ORAL DAILY
COMMUNITY
Start: 2020-12-01

## 2023-05-19 RX ORDER — FERROUS SULFATE 325(65) MG
TABLET ORAL
COMMUNITY

## 2023-07-06 ENCOUNTER — ESTABLISHED PATIENT (OUTPATIENT)
Facility: LOCATION | Age: 78
End: 2023-07-06

## 2023-07-06 DIAGNOSIS — H04.123: ICD-10-CM

## 2023-07-06 PROCEDURE — 83861 MICROFLUID ANALY TEARS: CPT

## 2023-07-06 PROCEDURE — 68761 CLOSE TEAR DUCT OPENING: CPT

## 2023-07-06 PROCEDURE — 92285 EXTERNAL OCULAR PHOTOGRAPHY: CPT

## 2023-07-06 ASSESSMENT — VISUAL ACUITY
OU_SC: 20/30
OS_CC: 20/20
OU_CC: 20/20
OD_SC: 20/70
OS_SC: 20/30
OD_CC: 20/20

## 2023-10-03 ENCOUNTER — FOLLOW UP (OUTPATIENT)
Facility: LOCATION | Age: 78
End: 2023-10-03

## 2023-10-03 DIAGNOSIS — H04.123: ICD-10-CM

## 2023-10-03 PROCEDURE — 68761 CLOSE TEAR DUCT OPENING: CPT

## 2023-10-03 PROCEDURE — 83861 MICROFLUID ANALY TEARS: CPT

## 2023-10-03 PROCEDURE — 92285 EXTERNAL OCULAR PHOTOGRAPHY: CPT

## 2023-10-03 ASSESSMENT — VISUAL ACUITY
OU_SC: 20/20
OD_SC: 20/25
OS_SC: 20/40
OU_SC: 20/40
OS_SC: 20/25

## 2023-11-07 ENCOUNTER — COMPREHENSIVE EXAM (OUTPATIENT)
Facility: LOCATION | Age: 78
End: 2023-11-07

## 2023-11-07 DIAGNOSIS — H40.013: ICD-10-CM

## 2023-11-07 DIAGNOSIS — H04.123: ICD-10-CM

## 2023-11-07 PROCEDURE — 92015 DETERMINE REFRACTIVE STATE: CPT

## 2023-11-07 PROCEDURE — 99214 OFFICE O/P EST MOD 30 MIN: CPT

## 2023-11-07 PROCEDURE — 92133 CPTRZD OPH DX IMG PST SGM ON: CPT

## 2023-11-07 PROCEDURE — 92499OP2 OPTOMAP RETINAL SCREENING BOTH EYES

## 2023-11-07 ASSESSMENT — KERATOMETRY
OS_K2POWER_DIOPTERS: 45.50
OS_K1POWER_DIOPTERS: 44.75
OS_AXISANGLE2_DEGREES: 58
OD_K1POWER_DIOPTERS: 43.00
OS_AXISANGLE_DEGREES: 148
OD_K2POWER_DIOPTERS: 44.75
OD_AXISANGLE2_DEGREES: 17
OD_AXISANGLE_DEGREES: 107

## 2023-11-07 ASSESSMENT — VISUAL ACUITY
OU_SC: 20/30
OS_SC: 20/40

## 2023-11-07 ASSESSMENT — TONOMETRY
OD_IOP_MMHG: 15
OS_IOP_MMHG: 15

## 2024-01-04 ENCOUNTER — FOLLOW UP (OUTPATIENT)
Facility: LOCATION | Age: 79
End: 2024-01-04

## 2024-01-04 DIAGNOSIS — H04.123: ICD-10-CM

## 2024-01-04 PROCEDURE — 92285 EXTERNAL OCULAR PHOTOGRAPHY: CPT

## 2024-01-04 PROCEDURE — 83861 MICROFLUID ANALY TEARS: CPT

## 2024-01-04 PROCEDURE — 68761 CLOSE TEAR DUCT OPENING: CPT

## 2024-01-04 ASSESSMENT — KERATOMETRY
OD_K1POWER_DIOPTERS: 43.00
OS_AXISANGLE_DEGREES: 148
OD_AXISANGLE2_DEGREES: 17
OS_AXISANGLE2_DEGREES: 58
OD_AXISANGLE_DEGREES: 107
OS_K1POWER_DIOPTERS: 44.75
OS_K2POWER_DIOPTERS: 45.50
OD_K2POWER_DIOPTERS: 44.75

## 2024-01-04 ASSESSMENT — VISUAL ACUITY
OS_SC: 20/40
OD_CC: 20/25
OS_CC: 20/25
OD_SC: 20/70
OU_SC: 20/40
OU_CC: 20/25

## 2024-03-18 ENCOUNTER — FOLLOW UP (OUTPATIENT)
Facility: LOCATION | Age: 79
End: 2024-03-18

## 2024-03-18 DIAGNOSIS — H04.123: ICD-10-CM

## 2024-03-18 PROCEDURE — 68761 CLOSE TEAR DUCT OPENING: CPT

## 2024-03-18 PROCEDURE — 83861 MICROFLUID ANALY TEARS: CPT

## 2024-03-18 PROCEDURE — 92285 EXTERNAL OCULAR PHOTOGRAPHY: CPT

## 2024-03-18 ASSESSMENT — VISUAL ACUITY
OD_CC: 20/25
OU_SC: 20/30
OU_CC: 20/25
OS_CC: 20/25
OD_SC: 20/70
OS_SC: 20/40

## 2024-03-18 ASSESSMENT — KERATOMETRY
OS_K1POWER_DIOPTERS: 44.75
OS_K2POWER_DIOPTERS: 45.50
OD_AXISANGLE2_DEGREES: 17
OD_K1POWER_DIOPTERS: 43.00
OD_K2POWER_DIOPTERS: 44.75
OS_AXISANGLE2_DEGREES: 58
OS_AXISANGLE_DEGREES: 148
OD_AXISANGLE_DEGREES: 107

## 2024-03-26 ENCOUNTER — FOLLOW UP (OUTPATIENT)
Facility: LOCATION | Age: 79
End: 2024-03-26

## 2024-03-26 DIAGNOSIS — Z46.0: ICD-10-CM

## 2024-03-26 PROCEDURE — 92310-E CONTACT LENS FITTING ESTABLISH PATIENT

## 2024-04-09 ENCOUNTER — FOLLOW UP (OUTPATIENT)
Facility: LOCATION | Age: 79
End: 2024-04-09

## 2024-04-09 DIAGNOSIS — H53.8: ICD-10-CM

## 2024-04-09 PROCEDURE — 92310-F CONTACT LENS FITTING FOLLOW UP: Mod: NC

## 2024-04-09 ASSESSMENT — VISUAL ACUITY
OU_CC: 20/20
OD_CC: 20/70
OU_CC: 20/25
OS_CC: 20/20
OS_CC: 20/300

## 2024-06-11 ENCOUNTER — FOLLOW UP (OUTPATIENT)
Facility: LOCATION | Age: 79
End: 2024-06-11

## 2024-06-11 DIAGNOSIS — H53.8: ICD-10-CM

## 2024-06-11 DIAGNOSIS — H04.123: ICD-10-CM

## 2024-06-11 PROCEDURE — 92285 EXTERNAL OCULAR PHOTOGRAPHY: CPT

## 2024-06-11 PROCEDURE — 68761 CLOSE TEAR DUCT OPENING: CPT

## 2024-06-11 PROCEDURE — 83861 MICROFLUID ANALY TEARS: CPT

## 2024-06-11 ASSESSMENT — VISUAL ACUITY
OS_CC: 20/20
OD_CC: 20/25

## 2024-06-24 ENCOUNTER — FOLLOW UP (OUTPATIENT)
Facility: LOCATION | Age: 79
End: 2024-06-24

## 2024-06-24 DIAGNOSIS — H40.013: ICD-10-CM

## 2024-06-24 PROCEDURE — 92083 EXTENDED VISUAL FIELD XM: CPT

## 2024-06-24 PROCEDURE — 92020 GONIOSCOPY: CPT

## 2024-06-24 PROCEDURE — 99213 OFFICE O/P EST LOW 20 MIN: CPT

## 2024-06-24 PROCEDURE — 92133 CPTRZD OPH DX IMG PST SGM ON: CPT

## 2024-06-24 ASSESSMENT — TONOMETRY
OD_IOP_MMHG: 12
OS_IOP_MMHG: 12

## 2024-06-24 ASSESSMENT — VISUAL ACUITY
OD_CC: 20/20
OS_CC: 20/20

## 2024-09-10 ENCOUNTER — FOLLOW UP (OUTPATIENT)
Facility: LOCATION | Age: 79
End: 2024-09-10

## 2024-09-10 DIAGNOSIS — H04.123: ICD-10-CM

## 2024-09-10 PROCEDURE — 68761 CLOSE TEAR DUCT OPENING: CPT

## 2024-09-10 PROCEDURE — 92285 EXTERNAL OCULAR PHOTOGRAPHY: CPT

## 2024-09-10 PROCEDURE — 83861 MICROFLUID ANALY TEARS: CPT

## 2024-11-11 ENCOUNTER — COMPREHENSIVE EXAM (OUTPATIENT)
Facility: LOCATION | Age: 79
End: 2024-11-11

## 2024-11-11 DIAGNOSIS — H53.8: ICD-10-CM

## 2024-11-11 DIAGNOSIS — H40.013: ICD-10-CM

## 2024-11-11 PROCEDURE — 99214 OFFICE O/P EST MOD 30 MIN: CPT

## 2024-11-11 PROCEDURE — 92015 DETERMINE REFRACTIVE STATE: CPT

## 2024-11-11 PROCEDURE — 92133 CPTRZD OPH DX IMG PST SGM ON: CPT

## 2024-11-11 PROCEDURE — 92025 CPTRIZED CORNEAL TOPOGRAPHY: CPT

## 2024-11-11 PROCEDURE — 92499OP2 OPTOMAP RETINAL SCREENING BOTH EYES

## 2024-11-25 ENCOUNTER — CONTACT LENSES/GLASSES VISIT (OUTPATIENT)
Facility: LOCATION | Age: 79
End: 2024-11-25

## 2024-11-25 DIAGNOSIS — Z46.0: ICD-10-CM

## 2024-11-25 PROCEDURE — 92310F: Mod: NC

## 2025-04-16 NOTE — PROGRESS NOTES
PHYSICAL THERAPY - DAILY TREATMENT NOTE    Patient Name: Alf Bradley        Date: 2018  : 1945    Patient  Verified: YES  Visit #:      of   12  Insurance: Payor: Miguel Gómez / Plan: [place] MEDICARE PART A & B / Product Type: Medicare /      In time: 1:00 Out time: 2:00   Total Treatment Time: 60     Medicare Time Tracking (below)   Total Timed Codes (min):  50 1:1 Treatment Time:  30     TREATMENT AREA/ DIAGNOSIS = Right leg pain [M79.604]     SUBJECTIVE  Pain Level (on 0 to 10 scale):  0  / 10   Medication Changes/New allergies or changes in medical history, any new surgeries or procedures? NO    If yes, update Summary List   Subjective Functional Status/Changes:  []  No changes reported     Pt reports that going up stairs is getting better. Pt states that she thinks it could still get stronger.       OBJECTIVE  Modalities Rationale:     decrease inflammation to improve patient's ability to perform ADLs without pain     min [] Estim, type/location:                                      []  att     []  unatt     []  w/US     []  w/ice    []  w/heat    min []  Mechanical Traction: type/lbs                   []  pro   []  sup   []  int   []  cont    []  before manual    []  after manual    min []  Ultrasound, settings/location:      min []  Iontophoresis w/ dexamethasone, location:                                               []  take home patch       []  in clinic   10 min [x]  Ice     []  Heat    location/position:     min []  Vasopneumatic Device, press/temp:     min []  Other:    [] Skin assessment post-treatment (if applicable):    [x]  intact    [x]  redness- no adverse reaction     []redness - adverse reaction:        10 min Manual Therapy: STM to r hamstring and passive stretching of the R hamstring   Rationale:      increase ROM, increase tissue extensibility and decrease trigger points to improve patient's ability to perform ADLs without pain      40 min Therapeutic Exercise:  [x]  See flow sheet   Rationale:      increase strength and improve coordination to improve the patients ability to .perform ADLs without pain           Throughout treatment session min Patient Education:  Yes    [x] Reviewed HEP   []  Progressed/Changed HEP based on: Other Objective/Functional Measures:    Pt had no increase in pain during treatment session  Increased resistance during open chain hip strengthening exercises. Initiated prone HS curl    Post Treatment Pain Level (on 0 to 10) scale:   0  / 10     ASSESSMENT  Assessment/Changes in Function:     Pt is tolerating therex well based on her ability to perform exercises with increased resistance. []  See Progress Note/Recertification   Patient will continue to benefit from skilled PT services to modify and progress therapeutic interventions, address strength deficits, analyze and address soft tissue restrictions, analyze and modify body mechanics/ergonomics and assess and modify postural abnormalities to attain remaining goals.    Progress toward goals / Updated goals:    Continue with current treatment plan and progress as tolerated     PLAN  [x]  Upgrade activities as tolerated YES Continue plan of care   []  Discharge due to :    []  Other:      Therapist: Sudeep Sanches    Date: 7/30/2018 Time: 1:01 PM        Future Appointments  Date Time Provider Marcia Hurtado   8/3/2018 12:30 PM [facility]   8/6/2018 12:30 PM [facility]   8/10/2018 12:30 [facility]   8/13/2018 12:30 PM [facility]   8/17/2018 12:30 PM [facility]   8/20/2018 12:30 PM [facility]   8/24/2018 12:30 PM [facility]   8/27/2018 12:30 PM [facility]   8/31/2018 12:30 PM [facility]   11/20/2018 10:00 AM Clementine García MD [facility] Yes